# Patient Record
Sex: MALE | Race: WHITE | NOT HISPANIC OR LATINO
[De-identification: names, ages, dates, MRNs, and addresses within clinical notes are randomized per-mention and may not be internally consistent; named-entity substitution may affect disease eponyms.]

---

## 2022-02-08 PROBLEM — Z00.00 ENCOUNTER FOR PREVENTIVE HEALTH EXAMINATION: Status: ACTIVE | Noted: 2022-02-08

## 2022-02-10 ENCOUNTER — APPOINTMENT (OUTPATIENT)
Dept: CARDIOLOGY | Facility: CLINIC | Age: 67
End: 2022-02-10
Payer: COMMERCIAL

## 2022-02-10 ENCOUNTER — NON-APPOINTMENT (OUTPATIENT)
Age: 67
End: 2022-02-10

## 2022-02-10 VITALS
RESPIRATION RATE: 16 BRPM | WEIGHT: 207 LBS | HEART RATE: 72 BPM | OXYGEN SATURATION: 98 % | SYSTOLIC BLOOD PRESSURE: 154 MMHG | HEIGHT: 71 IN | DIASTOLIC BLOOD PRESSURE: 60 MMHG | BODY MASS INDEX: 28.98 KG/M2

## 2022-02-10 VITALS — SYSTOLIC BLOOD PRESSURE: 140 MMHG | DIASTOLIC BLOOD PRESSURE: 68 MMHG

## 2022-02-10 DIAGNOSIS — Z78.9 OTHER SPECIFIED HEALTH STATUS: ICD-10-CM

## 2022-02-10 DIAGNOSIS — Z80.3 FAMILY HISTORY OF MALIGNANT NEOPLASM OF BREAST: ICD-10-CM

## 2022-02-10 PROCEDURE — 93000 ELECTROCARDIOGRAM COMPLETE: CPT

## 2022-02-10 PROCEDURE — 99204 OFFICE O/P NEW MOD 45 MIN: CPT

## 2022-02-10 NOTE — HISTORY OF PRESENT ILLNESS
[FreeTextEntry1] : Patient is a 67yo M with HTN, HLD, family history of CAD and aneurysm  here for cardiac evaluation.  At beginning of pandemic was having heartburn and put on PPI which helped a bit. Symptoms also worse at night with burning and chest pressure. In past month symptoms better. Other night did get discomfort, does get better with tums. Occasionally would note some chest discomfort if walking fast or any activity he does quickly. Goes up to shoulders and neck as well. WOrse with stress too he states. NO dyspnea or associated symptoms. Patient denies PND/orthopnea/edema/palpitations/syncope/claudication. \par \par Works in construction.  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 \par \par ROS: GI negative, all others negative

## 2022-02-10 NOTE — PHYSICAL EXAM
[Soft] : abdomen soft [Non Tender] : non-tender [Normal] : no edema, no cyanosis, no clubbing, no varicosities [Moves all extremities] : moves all extremities [Alert and Oriented] : alert and oriented

## 2022-02-10 NOTE — DISCUSSION/SUMMARY
[FreeTextEntry1] : Patient is a 67yo M with HTN, HLD, family history of CAD  here for cardiac evaluation.\par -ECG abnormal with IVCD, NSST and PVCs, needs further cardiac work up for structural heart disease and ischemia \par -Also CP with typical and atypical features. Could be all GI but also could be ischemic. NEeds work up\par -Exam unremarkable \par \par 1.Echo and nuclear stress testing to evaluate CP/abnormal ECG\par 2.Holter to evaluate burden of PVCs\par 3. Aortic scan due to family history AAA and HTN\par 4.Continue high dose statin\par 5.BP mildly elevated today, will monitor during stress test and at follow up before med changes. STates usually lower\par 6. Follow up after testing

## 2022-02-18 ENCOUNTER — APPOINTMENT (OUTPATIENT)
Dept: CARDIOLOGY | Facility: CLINIC | Age: 67
End: 2022-02-18
Payer: COMMERCIAL

## 2022-02-18 PROCEDURE — 93978 VASCULAR STUDY: CPT

## 2022-02-18 PROCEDURE — 93306 TTE W/DOPPLER COMPLETE: CPT

## 2022-03-07 ENCOUNTER — APPOINTMENT (OUTPATIENT)
Dept: CARDIOLOGY | Facility: CLINIC | Age: 67
End: 2022-03-07
Payer: COMMERCIAL

## 2022-03-07 PROCEDURE — A9500: CPT

## 2022-03-07 PROCEDURE — 78452 HT MUSCLE IMAGE SPECT MULT: CPT

## 2022-03-07 PROCEDURE — 93015 CV STRESS TEST SUPVJ I&R: CPT

## 2022-03-11 ENCOUNTER — TRANSCRIPTION ENCOUNTER (OUTPATIENT)
Age: 67
End: 2022-03-11

## 2022-03-12 RX ORDER — CHLORHEXIDINE GLUCONATE 213 G/1000ML
1 SOLUTION TOPICAL ONCE
Refills: 0 | Status: COMPLETED | OUTPATIENT
Start: 2022-03-14 | End: 2022-03-15

## 2022-03-12 NOTE — H&P PST ADULT - ASSESSMENT
Risk Assessments:  ASA:  Mallampati:  GFR:   Cr:  BRA:      Impression:  66 year old male with PMH of HTN, HLD, faqmily history of CAD with intermittent persistent chest pain and abnormal NST and reduced EF 45-50% now here for [facility].      Plan:    -plan for *** via ***  -patient seen and examined  -confirmed appropriate NPO duration  -ECG and Labs reviewed  -Aspirin 81mg po pre-cath  -procedure discussed with patient; risks and benefits explained, questions answered  -consent obtained by attending IC   Risk Assessments:  ASA:  3  Mallampati:  2  GFR:   Cr:  BRA:      Impression:  66 year old male with PMH of HTN, HLD, faqmily history of CAD with intermittent persistent chest pain and abnormal NST and reduced EF 45-50% now here for LHC.      Plan:    -plan for LHC  -patient seen and examined  -confirmed appropriate NPO duration  -ECG and Labs reviewed  -Aspirin 81mg po pre-cath  -procedure discussed with patient; risks and benefits explained, questions answered  -consent obtained by attending IC   Risk Assessments:  ASA:  3  Mallampati:  2  GFR: 85  Cr:  0.98  BRA: 1.0%      Impression:  66 year old male with PMH of HTN, HLD, faqmily history of CAD with intermittent persistent chest pain and abnormal NST and reduced EF 45-50% now here for LHC.      Plan:    -plan for LHC  -patient seen and examined  -confirmed appropriate NPO duration  -ECG and Labs reviewed  -Aspirin 81mg po pre-cath  -procedure discussed with patient; risks and benefits explained, questions answered  -consent obtained by attending IC

## 2022-03-14 ENCOUNTER — TRANSCRIPTION ENCOUNTER (OUTPATIENT)
Age: 67
End: 2022-03-14

## 2022-03-14 ENCOUNTER — INPATIENT (INPATIENT)
Facility: HOSPITAL | Age: 67
LOS: 5 days | Discharge: HOME | DRG: 234 | End: 2022-03-20
Attending: THORACIC SURGERY (CARDIOTHORACIC VASCULAR SURGERY) | Admitting: INTERNAL MEDICINE
Payer: COMMERCIAL

## 2022-03-14 VITALS
TEMPERATURE: 98 F | SYSTOLIC BLOOD PRESSURE: 131 MMHG | WEIGHT: 207.01 LBS | HEIGHT: 71 IN | DIASTOLIC BLOOD PRESSURE: 63 MMHG | RESPIRATION RATE: 16 BRPM | OXYGEN SATURATION: 98 % | HEART RATE: 61 BPM

## 2022-03-14 DIAGNOSIS — Z90.89 ACQUIRED ABSENCE OF OTHER ORGANS: Chronic | ICD-10-CM

## 2022-03-14 DIAGNOSIS — Z98.890 OTHER SPECIFIED POSTPROCEDURAL STATES: Chronic | ICD-10-CM

## 2022-03-14 DIAGNOSIS — R94.39 ABNORMAL RESULT OF OTHER CARDIOVASCULAR FUNCTION STUDY: ICD-10-CM

## 2022-03-14 DIAGNOSIS — I25.10 ATHEROSCLEROTIC HEART DISEASE OF NATIVE CORONARY ARTERY WITHOUT ANGINA PECTORIS: ICD-10-CM

## 2022-03-14 LAB
A1C WITH ESTIMATED AVERAGE GLUCOSE RESULT: 6 % — HIGH (ref 4–5.6)
ALBUMIN SERPL ELPH-MCNC: 4.5 G/DL — SIGNIFICANT CHANGE UP (ref 3.3–5.2)
ALP SERPL-CCNC: 77 U/L — SIGNIFICANT CHANGE UP (ref 40–120)
ALT FLD-CCNC: 20 U/L — SIGNIFICANT CHANGE UP
ANION GAP SERPL CALC-SCNC: 10 MMOL/L — SIGNIFICANT CHANGE UP (ref 5–17)
AST SERPL-CCNC: 19 U/L — SIGNIFICANT CHANGE UP
BASOPHILS # BLD AUTO: 0.03 K/UL — SIGNIFICANT CHANGE UP (ref 0–0.2)
BASOPHILS NFR BLD AUTO: 0.4 % — SIGNIFICANT CHANGE UP (ref 0–2)
BILIRUB SERPL-MCNC: 0.6 MG/DL — SIGNIFICANT CHANGE UP (ref 0.4–2)
BUN SERPL-MCNC: 19.3 MG/DL — SIGNIFICANT CHANGE UP (ref 8–20)
CALCIUM SERPL-MCNC: 9.2 MG/DL — SIGNIFICANT CHANGE UP (ref 8.6–10.2)
CHLORIDE SERPL-SCNC: 103 MMOL/L — SIGNIFICANT CHANGE UP (ref 98–107)
CHOLEST SERPL-MCNC: 164 MG/DL — SIGNIFICANT CHANGE UP
CO2 SERPL-SCNC: 30 MMOL/L — HIGH (ref 22–29)
CREAT SERPL-MCNC: 0.98 MG/DL — SIGNIFICANT CHANGE UP (ref 0.5–1.3)
EGFR: 85 ML/MIN/1.73M2 — SIGNIFICANT CHANGE UP
EOSINOPHIL # BLD AUTO: 0.11 K/UL — SIGNIFICANT CHANGE UP (ref 0–0.5)
EOSINOPHIL NFR BLD AUTO: 1.5 % — SIGNIFICANT CHANGE UP (ref 0–6)
ESTIMATED AVERAGE GLUCOSE: 126 MG/DL — HIGH (ref 68–114)
GLUCOSE SERPL-MCNC: 108 MG/DL — HIGH (ref 70–99)
HCT VFR BLD CALC: 43.9 % — SIGNIFICANT CHANGE UP (ref 39–50)
HDLC SERPL-MCNC: 56 MG/DL — SIGNIFICANT CHANGE UP
HGB BLD-MCNC: 14.5 G/DL — SIGNIFICANT CHANGE UP (ref 13–17)
IMM GRANULOCYTES NFR BLD AUTO: 0.1 % — SIGNIFICANT CHANGE UP (ref 0–1.5)
LIPID PNL WITH DIRECT LDL SERPL: 92 MG/DL — SIGNIFICANT CHANGE UP
LYMPHOCYTES # BLD AUTO: 2.25 K/UL — SIGNIFICANT CHANGE UP (ref 1–3.3)
LYMPHOCYTES # BLD AUTO: 30.7 % — SIGNIFICANT CHANGE UP (ref 13–44)
MAGNESIUM SERPL-MCNC: 2 MG/DL — SIGNIFICANT CHANGE UP (ref 1.6–2.6)
MCHC RBC-ENTMCNC: 29.1 PG — SIGNIFICANT CHANGE UP (ref 27–34)
MCHC RBC-ENTMCNC: 33 GM/DL — SIGNIFICANT CHANGE UP (ref 32–36)
MCV RBC AUTO: 88.2 FL — SIGNIFICANT CHANGE UP (ref 80–100)
MONOCYTES # BLD AUTO: 0.67 K/UL — SIGNIFICANT CHANGE UP (ref 0–0.9)
MONOCYTES NFR BLD AUTO: 9.1 % — SIGNIFICANT CHANGE UP (ref 2–14)
NEUTROPHILS # BLD AUTO: 4.26 K/UL — SIGNIFICANT CHANGE UP (ref 1.8–7.4)
NEUTROPHILS NFR BLD AUTO: 58.2 % — SIGNIFICANT CHANGE UP (ref 43–77)
NON HDL CHOLESTEROL: 108 MG/DL — SIGNIFICANT CHANGE UP
PLATELET # BLD AUTO: 277 K/UL — SIGNIFICANT CHANGE UP (ref 150–400)
POTASSIUM SERPL-MCNC: 3.8 MMOL/L — SIGNIFICANT CHANGE UP (ref 3.5–5.3)
POTASSIUM SERPL-SCNC: 3.8 MMOL/L — SIGNIFICANT CHANGE UP (ref 3.5–5.3)
PROT SERPL-MCNC: 7.2 G/DL — SIGNIFICANT CHANGE UP (ref 6.6–8.7)
RBC # BLD: 4.98 M/UL — SIGNIFICANT CHANGE UP (ref 4.2–5.8)
RBC # FLD: 12.9 % — SIGNIFICANT CHANGE UP (ref 10.3–14.5)
SODIUM SERPL-SCNC: 143 MMOL/L — SIGNIFICANT CHANGE UP (ref 135–145)
TRIGL SERPL-MCNC: 78 MG/DL — SIGNIFICANT CHANGE UP
TROPONIN T SERPL-MCNC: <0.01 NG/ML — SIGNIFICANT CHANGE UP (ref 0–0.06)
WBC # BLD: 7.33 K/UL — SIGNIFICANT CHANGE UP (ref 3.8–10.5)
WBC # FLD AUTO: 7.33 K/UL — SIGNIFICANT CHANGE UP (ref 3.8–10.5)

## 2022-03-14 PROCEDURE — 93306 TTE W/DOPPLER COMPLETE: CPT | Mod: 26

## 2022-03-14 PROCEDURE — 71045 X-RAY EXAM CHEST 1 VIEW: CPT | Mod: 26

## 2022-03-14 PROCEDURE — 99152 MOD SED SAME PHYS/QHP 5/>YRS: CPT | Mod: 59

## 2022-03-14 PROCEDURE — 93880 EXTRACRANIAL BILAT STUDY: CPT | Mod: 26

## 2022-03-14 PROCEDURE — 93454 CORONARY ARTERY ANGIO S&I: CPT | Mod: 26

## 2022-03-14 PROCEDURE — 93010 ELECTROCARDIOGRAM REPORT: CPT

## 2022-03-14 RX ORDER — CHLORHEXIDINE GLUCONATE 213 G/1000ML
15 SOLUTION TOPICAL ONCE
Refills: 0 | Status: COMPLETED | OUTPATIENT
Start: 2022-03-14 | End: 2022-03-15

## 2022-03-14 RX ORDER — CEFUROXIME AXETIL 250 MG
1500 TABLET ORAL ONCE
Refills: 0 | Status: DISCONTINUED | OUTPATIENT
Start: 2022-03-15 | End: 2022-03-15

## 2022-03-14 RX ORDER — METOPROLOL TARTRATE 50 MG
12.5 TABLET ORAL
Refills: 0 | Status: DISCONTINUED | OUTPATIENT
Start: 2022-03-14 | End: 2022-03-14

## 2022-03-14 RX ORDER — VANCOMYCIN HCL 1 G
1500 VIAL (EA) INTRAVENOUS ONCE
Refills: 0 | Status: DISCONTINUED | OUTPATIENT
Start: 2022-03-15 | End: 2022-03-15

## 2022-03-14 RX ORDER — PANTOPRAZOLE SODIUM 20 MG/1
40 TABLET, DELAYED RELEASE ORAL
Refills: 0 | Status: DISCONTINUED | OUTPATIENT
Start: 2022-03-14 | End: 2022-03-15

## 2022-03-14 RX ORDER — ASPIRIN/CALCIUM CARB/MAGNESIUM 324 MG
81 TABLET ORAL DAILY
Refills: 0 | Status: DISCONTINUED | OUTPATIENT
Start: 2022-03-14 | End: 2022-03-15

## 2022-03-14 RX ORDER — CHLORHEXIDINE GLUCONATE 213 G/1000ML
1 SOLUTION TOPICAL ONCE
Refills: 0 | Status: COMPLETED | OUTPATIENT
Start: 2022-03-14 | End: 2022-03-14

## 2022-03-14 RX ORDER — ATORVASTATIN CALCIUM 80 MG/1
80 TABLET, FILM COATED ORAL AT BEDTIME
Refills: 0 | Status: DISCONTINUED | OUTPATIENT
Start: 2022-03-14 | End: 2022-03-15

## 2022-03-14 RX ADMIN — CHLORHEXIDINE GLUCONATE 1 APPLICATION(S): 213 SOLUTION TOPICAL at 22:12

## 2022-03-14 RX ADMIN — ATORVASTATIN CALCIUM 80 MILLIGRAM(S): 80 TABLET, FILM COATED ORAL at 22:08

## 2022-03-14 RX ADMIN — Medication 12.5 MILLIGRAM(S): at 18:59

## 2022-03-14 NOTE — CONSULT NOTE ADULT - ASSESSMENT
HPI:  Narrative: 66 year old male with PMH of HTN, HLD, faqmily history of CAD with intermittent persistent chest pain and abnormal NST and reduced EF 45-50% now here for [facility].    Cath results as follows:    ca< from: Cardiac Catheterization (22 @ 13:51) >  [facility]  Department of Cardiology  [address] [place] [address]  [phone]  Cath Lab Report -- Comprehensive Report  Patient: TREVOR JAIN  Study date: 2022  Account number: [id number]  MR number: [id number]  : 1955  Gender: Male  Race: W  Case Physician(s):  Mo Andujar MD  Fellow:  Tal Anaya M.D.  Referring Physician:  Tacos Bush M.D.  INDICATIONS: Abnormal stress test.  HISTORY: Atypical symptoms with high risk nuclear stress test.  PROCEDURE:  --  Right coronary angiography.  --  Left coronary angiography.  TECHNIQUE: The risks and alternatives of the procedures and conscious  sedation were explained to the patient and informed consent was obtained.  Cardiac catheterization performed electively.  Local anesthetic given. Right radial artery access. Right coronary artery  angiography. The vessel was injected utilizing a catheter. Left coronary  artery angiography. The vessel was injected utilizing a catheter.  RADIATION EXPOSURE: 1.9 min.  CONTRAST GIVEN: Omnipaque 45 ml.  MEDICATIONS GIVEN: Midazolam, 1 mg, IV. Fentanyl, 50 mcg, IV. Verapamil  (Isoptin, Calan, Covera), 5 mg, IA. Heparin, 4000 units, IA. 1% Lidocaine,  5 ml, subcutaneously.  VENTRICLES: No LV gram was performed; however, a recent echocardiogram  demonstrated an EF of 45 %.  CORONARY VESSELS: The coronary circulation is left dominant.  LM:   --  Mid left main: There was a 50 % stenosis.  --  Distal left main: There was a 80 % stenosis.  LAD:   --  Proximal LAD: There was a 30 % stenosis.  CX:   --  Proximal circumflex: There was a 95 % stenosis.  --  OM2: There was a 90 % stenosis.  RI:   --  Ramus intermedius: There was a 80 % stenosis.  RCA:   --  RCA: Normal.  COMPLICATIONS: No complications occurred during the cath lab visit.  DIAGNOSTIC IMPRESSIONS: Severe distal LM, ostial circumflex disease in left  dominant system.  Mildly depressed LV function by TTE.  DIAGNOSTIC RECOMMENDATIONS: CT surgery evaluation for CABG.  Prepared and signed by  Mo Andujar MD  Signed 2022 14:48:58  HEMODYNAMIC TABLES  Pressures:  Baseline  Pressures:  - HR: 59  Pressures:  - Rhythm:  Pressures:  -- Aortic Pressure (S/D/M): 115/59/80  Outputs:  Baseline  Outputs:  -- CALCULATIONS[age]  Outputs:  -- CALCULATIONS: Body Surface Area: 2.14  Outputs:  -- CALCULATIONS: Height in cm: 180.00  Outputs:  -- CALCULATIONS: Sex: Male  Outputs:  -- CALCULATIONS: Weight in k.90  Outputs:  -- OUTPUTS: O2 consumption: 267.14  Outputs:  -- OUTPUTS: Vo2 Indexed: 125.00    < end of copied text >       HPI:  Narrative: 66 year old male with PMH of HTN, HLD, family history of CAD with intermittent persistent chest pain and abnormal NST and reduced EF 45-50% now here for [facility].      Cath results as follows:    ca< from: Cardiac Catheterization (22 @ 13:51) >  [facility]  Department of Cardiology  [address] [place] [address]  [phone]  Cath Lab Report -- Comprehensive Report  Patient: TREVOR JAIN  Study date: 2022  Account number: [id number]  MR number: [id number]  : 1955  Gender: Male  Race: W  Case Physician(s):  Mo Andujar MD  Fellow:  Tal Anaya M.D.  Referring Physician:  Tacos Bush M.D.  INDICATIONS: Abnormal stress test.  HISTORY: Atypical symptoms with high risk nuclear stress test.  PROCEDURE:  --  Right coronary angiography.  --  Left coronary angiography.  TECHNIQUE: The risks and alternatives of the procedures and conscious  sedation were explained to the patient and informed consent was obtained.  Cardiac catheterization performed electively.  Local anesthetic given. Right radial artery access. Right coronary artery  angiography. The vessel was injected utilizing a catheter. Left coronary  artery angiography. The vessel was injected utilizing a catheter.  RADIATION EXPOSURE: 1.9 min.  CONTRAST GIVEN: Omnipaque 45 ml.  MEDICATIONS GIVEN: Midazolam, 1 mg, IV. Fentanyl, 50 mcg, IV. Verapamil  (Isoptin, Calan, Covera), 5 mg, IA. Heparin, 4000 units, IA. 1% Lidocaine,  5 ml, subcutaneously.  VENTRICLES: No LV gram was performed; however, a recent echocardiogram  demonstrated an EF of 45 %.  CORONARY VESSELS: The coronary circulation is left dominant.  LM:   --  Mid left main: There was a 50 % stenosis.  --  Distal left main: There was a 80 % stenosis.  LAD:   --  Proximal LAD: There was a 30 % stenosis.  CX:   --  Proximal circumflex: There was a 95 % stenosis.  --  OM2: There was a 90 % stenosis.  RI:   --  Ramus intermedius: There was a 80 % stenosis.  RCA:   --  RCA: Normal.  COMPLICATIONS: No complications occurred during the cath lab visit.  DIAGNOSTIC IMPRESSIONS: Severe distal LM, ostial circumflex disease in left  dominant system.  Mildly depressed LV function by TTE.  DIAGNOSTIC RECOMMENDATIONS: CT surgery evaluation for CABG.  Prepared and signed by  Mo Andujar MD  Signed 2022 14:48:58  HEMODYNAMIC TABLES  Pressures:  Baseline  Pressures:  - HR: 59  Pressures:  - Rhythm:  Pressures:  -- Aortic Pressure (S/D/M): 115/59/80  Outputs:  Baseline  Outputs:  -- CALCULATIONS[age]  Outputs:  -- CALCULATIONS: Body Surface Area: 2.14  Outputs:  -- CALCULATIONS: Height in cm: 180.00  Outputs:  -- CALCULATIONS: Sex: Male  Outputs:  -- CALCULATIONS: Weight in k.90  Outputs:  -- OUTPUTS: O2 consumption: 267.14  Outputs:  -- OUTPUTS: Vo2 Indexed: 125.00    < end of copied text >    Bilateral lower extremity vein mapping performed:  Both legs with usable conduit.  Right thigh vein mildly thickened as compared to left thigh    Radial artery ultrasound performed:  radial artery NOT suitable for bypass conduit secondary to bifurcation and small caliber.

## 2022-03-14 NOTE — CONSULT NOTE ADULT - PROBLEM SELECTOR RECOMMENDATION 9
Severe distal LM disease with mildly depressed LV function  CABG work up as per Dr. Murdock  Radials Rudi test to assess suitability of redial artery for bypass conduit  Continue Lipitor  Start Beta blocker  Continue ASA  Plan for OR tomorrow Severe distal LM disease with mildly depressed LV function  CABG work up as per Dr. Murdock  Radials Rudi test to assess suitability of redial artery for bypass conduit reveals artery NOT suitable  Bilateral saphenous veins usable for by[pass conduit   Continue Lipitor  Start Beta blocker low dose as tolerated  Continue ASA  Plan for OR tomorrow

## 2022-03-14 NOTE — CONSULT NOTE ADULT - SUBJECTIVE AND OBJECTIVE BOX
HPI:  Narrative: 66 year old male with PMH of HTN, HLD, faqmily history of CAD with intermittent persistent chest pain and abnormal NST and reduced EF 45-50% now here for [facility].    Symptoms:        Angina (Class): CCS2       Ischemic Symptoms: Chest pain     Heart Failure: New        Systolic/Diastolic/Combined: systolic        NYHA Class (within 2 weeks): II    Assessment of LVEF (Must be within 6 months):        EF: 45-50%        Assessed by: TTE        Date: 2/18/2022    Prior Cardiac Interventions (LHC, stents, CABG): NO      Noninvasive Testing:   Stress Test: Date: 3/7/2022       Protocol: Regadenoson        Duration of Exercise: 4 minutes and 39 seconds        Symptoms: atypical chest pain/nausea        EKG Changes: frequent PVC's        DTS: -3       Myocardial Imaging: moderate to large sized area of moderate inferior/inferolateral wall ischemia        Risk Assessment (Low, Medium, High): medium     Echo (Date, Findings): 2/18/2022 EF 45-50%, Basal inferiro segment, basal inferoseptal segment, basal inferolateral segment, and mid inferiro segment are moderately hypokinetic, mild VA/TR     Antianginal Therapies:        Beta Blockers:         Calcium Channel Blockers:        Long Acting Nitrates:        Ranexa:     Associated Risk Factors:        Cerebrovascular Disease: N/A       Chronic Lung Disease: N/A       Peripheral Arterial Disease: N/A       Chronic Kidney Disease (if yes, what is GFR): N/A       Uncontrolled Diabetes (if yes, what is HgbA1C or FBS): N/A       Poorly Controlled Hypertension (if yes, what is SBP): N/A       Morbid Obesity (if yes, what is BMI): N/A       History of Recent Ventricular Arrhythmia: N/A       Inability to Ambulate Safely: N/A       Need for Therapeutic Anticoagulation: N/A       Antiplatelet or Contrast Allergy: N/A    Social History:        Marital:  ; employed in home construction       Tobacco: Never       ETOH: 1-3 beers 3x/week       Caffeine: 1-3 cups coffee daily    ROS:  CONSTITUTIONAL: No weakness, fevers or chills  EYES/ENT: No visual changes;  No vertigo or throat pain   NECK: No pain or stiffness  RESPIRATORY: No cough, wheezing, hemoptysis; No shortness of breath  CARDIOVASCULAR: No chest pain or palpitations  GASTROINTESTINAL: Epigastric burning and reflux; No abdominal or epigastric pain. No nausea, vomiting, or hematemesis; No diarrhea or constipation. No melena or hematochezia.  GENITOURINARY: No dysuria, frequency or hematuria  NEUROLOGICAL: No numbness or weakness  SKIN: No itching, burning, rashes, or lesions   All other review of systems is negative unless indicated above    PHYSICAL EXAM:    Vital Signs Last 24 Hrs  T(F): 98.6  HR: 57  BP: 131/63  RR: 20  SpO2: 98%    GENERAL: Pt lying comfortably, NAD.  ENMT: PERRL, +EOMI.  NECK: soft, Supple, No JVD,   CHEST/LUNG: Clear to auscultation bilaterally; No wheezing.  HEART: S1S2+, Regular rate and rhythm; No murmurs.  ABDOMEN: Soft, Nontender, Nondistended; Bowel sounds present.  MUSCULOSKELETAL: Normal range of motion.  SKIN: No rashes or lesions.  NEURO: AAOX3, no focal deficits, no motor r sensory loss.  PSYCH: normal mood.  VASCULAR:   Radial +2 R/+2 L  Femoral +2 R/+2 L  PT +2 R/+2 L  DP +2 R/+2 L    EKG: SB with PVC   (12 Mar 2022 16:04)      PAST MEDICAL & SURGICAL HISTORY:  HTN (hypertension)    HLD (hyperlipidemia)    H/O carpal tunnel repair    S/P tonsillectomy        Allergies    naproxen (Unknown)    Intolerances        MEDICATIONS  (STANDING):  atorvastatin 80 milliGRAM(s) Oral at bedtime  chlorhexidine 4% Liquid 1 Application(s) Topical Once  pantoprazole    Tablet 40 milliGRAM(s) Oral before breakfast    MEDICATIONS  (PRN):  aluminum hydroxide/magnesium hydroxide/simethicone Suspension 30 milliLiter(s) Oral every 6 hours PRN Dyspepsia      Social History:      FAMILY HISTORY:                            14.5   7.33  )-----------( 277      ( 14 Mar 2022 11:22 )             43.9   03-14    143  |  103  |  19.3  ----------------------------<  108<H>  3.8   |  30.0<H>  |  0.98    Ca    9.2      14 Mar 2022 11:22  Mg     2.0     03-14    TPro  7.2  /  Alb  4.5  /  TBili  0.6  /  DBili  x   /  AST  19  /  ALT  20  /  AlkPhos  77  03-14      CXR:          ICU Vital Signs Last 24 Hrs  T(C): 36.7 (14 Mar 2022 11:35), Max: 36.7 (14 Mar 2022 11:35)  T(F): 98 (14 Mar 2022 11:35), Max: 98 (14 Mar 2022 11:35)  HR: 65 (14 Mar 2022 15:00) (61 - 67)  BP: 131/62 (14 Mar 2022 15:00) (131/62 - 150/73)  BP(mean): --  ABP: --  ABP(mean): --  RR: 16 (14 Mar 2022 15:00) (16 - 19)  SpO2: 99% (14 Mar 2022 15:00) (98% - 99%)  I&O's Detail       HPI:  Narrative: 66 year old male with PMH of HTN, HLD, faqmily history of CAD with intermittent persistent chest pain and abnormal NST and reduced EF 45-50% now here for [facility].    Symptoms:        Angina (Class): CCS2       Ischemic Symptoms: Chest pain     Heart Failure: New        Systolic/Diastolic/Combined: systolic        NYHA Class (within 2 weeks): II    Assessment of LVEF (Must be within 6 months):        EF: 45-50%        Assessed by: TTE        Date: 2/18/2022    Prior Cardiac Interventions (LHC, stents, CABG): NO      Noninvasive Testing:   Stress Test: Date: 3/7/2022       Protocol: Regadenoson        Duration of Exercise: 4 minutes and 39 seconds        Symptoms: atypical chest pain/nausea        EKG Changes: frequent PVC's        DTS: -3       Myocardial Imaging: moderate to large sized area of moderate inferior/inferolateral wall ischemia        Risk Assessment (Low, Medium, High): medium     Echo (Date, Findings): 2/18/2022 EF 45-50%, Basal inferiro segment, basal inferoseptal segment, basal inferolateral segment, and mid inferiro segment are moderately hypokinetic, mild IN/TR     Antianginal Therapies:        Beta Blockers:         Calcium Channel Blockers:        Long Acting Nitrates:        Ranexa:     Associated Risk Factors:        Cerebrovascular Disease: N/A       Chronic Lung Disease: N/A       Peripheral Arterial Disease: N/A       Chronic Kidney Disease (if yes, what is GFR): N/A       Uncontrolled Diabetes (if yes, what is HgbA1C or FBS): N/A       Poorly Controlled Hypertension (if yes, what is SBP): N/A       Morbid Obesity (if yes, what is BMI): N/A       History of Recent Ventricular Arrhythmia: N/A       Inability to Ambulate Safely: N/A       Need for Therapeutic Anticoagulation: N/A       Antiplatelet or Contrast Allergy: N/A    Social History:        Marital:  ; employed in home construction       Tobacco: Never       ETOH: 1-3 beers 3x/week       Caffeine: 1-3 cups coffee daily    ROS:  CONSTITUTIONAL: No weakness, fevers or chills  EYES/ENT: No visual changes;  No vertigo or throat pain   NECK: No pain or stiffness  RESPIRATORY: No cough, wheezing, hemoptysis; No shortness of breath  CARDIOVASCULAR: No chest pain or palpitations  GASTROINTESTINAL: Epigastric burning and reflux; No abdominal or epigastric pain. No nausea, vomiting, or hematemesis; No diarrhea or constipation. No melena or hematochezia.  GENITOURINARY: No dysuria, frequency or hematuria  NEUROLOGICAL: No numbness or weakness  SKIN: No itching, burning, rashes, or lesions   All other review of systems is negative unless indicated above    PHYSICAL EXAM:    Vital Signs Last 24 Hrs  T(F): 98.6  HR: 57  BP: 131/63  RR: 20  SpO2: 98%    GENERAL: Pt lying comfortably, NAD.  ENMT: PERRL, +EOMI.  NECK: soft, Supple, No JVD,   CHEST/LUNG: Clear to auscultation bilaterally; No wheezing.  HEART: S1S2+, Regular rate and rhythm; No murmurs.  ABDOMEN: Soft, Nontender, Nondistended; Bowel sounds present.  MUSCULOSKELETAL: Normal range of motion.  SKIN: No rashes or lesions.  NEURO: AAOX3, no focal deficits, no motor r sensory loss.  PSYCH: normal mood.  VASCULAR:   Radial +2 R/+2 L  Femoral +2 R/+2 L  PT +2 R/+2 L  DP +2 R/+2 L    EKG: SB with PVC   (12 Mar 2022 16:04)      PAST MEDICAL & SURGICAL HISTORY:  HTN (hypertension)    HLD (hyperlipidemia)    H/O carpal tunnel repair    S/P tonsillectomy        Allergies    naproxen (Unknown)    Intolerances    Home Medications:  aspirin 81 mg oral tablet: orally once a day (14 Mar 2022 11:22)  Lipitor 80 mg oral tablet: 1 tab(s) orally once a day (14 Mar 2022 11:21)  losartan-hydrochlorothiazide 100 mg-25 mg oral tablet: 1 tab(s) orally once a day (14 Mar 2022 11:21)          MEDICATIONS  (STANDING):  atorvastatin 80 milliGRAM(s) Oral at bedtime  chlorhexidine 4% Liquid 1 Application(s) Topical Once  pantoprazole    Tablet 40 milliGRAM(s) Oral before breakfast    MEDICATIONS  (PRN):  aluminum hydroxide/magnesium hydroxide/simethicone Suspension 30 milliLiter(s) Oral every 6 hours PRN Dyspepsia      Social History:      FAMILY HISTORY:                            14.5   7.33  )-----------( 277      ( 14 Mar 2022 11:22 )             43.9   03-14    143  |  103  |  19.3  ----------------------------<  108<H>  3.8   |  30.0<H>  |  0.98    Ca    9.2      14 Mar 2022 11:22  Mg     2.0     03-14    TPro  7.2  /  Alb  4.5  /  TBili  0.6  /  DBili  x   /  AST  19  /  ALT  20  /  AlkPhos  77  03-14      CXR:          ICU Vital Signs Last 24 Hrs  T(C): 36.7 (14 Mar 2022 11:35), Max: 36.7 (14 Mar 2022 11:35)  T(F): 98 (14 Mar 2022 11:35), Max: 98 (14 Mar 2022 11:35)  HR: 65 (14 Mar 2022 15:00) (61 - 67)  BP: 131/62 (14 Mar 2022 15:00) (131/62 - 150/73)  BP(mean): --  ABP: --  ABP(mean): --  RR: 16 (14 Mar 2022 15:00) (16 - 19)  SpO2: 99% (14 Mar 2022 15:00) (98% - 99%)  I&O's Detail

## 2022-03-14 NOTE — PROGRESS NOTE ADULT - SUBJECTIVE AND OBJECTIVE BOX
Now s/p LHC via RRA with 70-80% dLM, 50% oLAD, 95% oLCx. Procedure performed by Dr. Andujar. He recieved Heparin 4000 units and IV sedation intraprocedurally, arrived to recovery in NAD and HDS, RRA access site stable, no bleed/hematoma, distal pulse +  Pt tolerated procedure well  CTS consult called    VSS  Awake and alert  Lungs CTA  Heart normal S1, S2  RRA with hemoband in place. No oozing or hematoma. Radial 2+. R hand WWP without motor or sensory deficits    Plan:  Admit to CTS for OHS  -Formal cath report pending  -Post procedure management/monitoring per protocol  -Access site precautions  -Radial compression band removal at 1600  -Bedrest x 2 hours post procedure  -Labs and EKG in am  -Repeat ECG if any clinical indication or change on tele  -Continue statin  -No BB sec to bradycardia  -Educated regarding post procedure management and care  -Discussed the importance of RF modification

## 2022-03-15 ENCOUNTER — APPOINTMENT (OUTPATIENT)
Dept: CARDIOTHORACIC SURGERY | Facility: HOSPITAL | Age: 67
End: 2022-03-15

## 2022-03-15 LAB
ABO RH CONFIRMATION: SIGNIFICANT CHANGE UP
ALBUMIN SERPL ELPH-MCNC: 3.1 G/DL — LOW (ref 3.3–5.2)
ALP SERPL-CCNC: 49 U/L — SIGNIFICANT CHANGE UP (ref 40–120)
ALT FLD-CCNC: 11 U/L — SIGNIFICANT CHANGE UP
ANION GAP SERPL CALC-SCNC: 13 MMOL/L — SIGNIFICANT CHANGE UP (ref 5–17)
ANION GAP SERPL CALC-SCNC: 13 MMOL/L — SIGNIFICANT CHANGE UP (ref 5–17)
APPEARANCE UR: CLEAR — SIGNIFICANT CHANGE UP
APTT BLD: 25.4 SEC — LOW (ref 27.5–35.5)
APTT BLD: 26.9 SEC — LOW (ref 27.5–35.5)
APTT BLD: 27.8 SEC — SIGNIFICANT CHANGE UP (ref 27.5–35.5)
AST SERPL-CCNC: 24 U/L — SIGNIFICANT CHANGE UP
BASE EXCESS BLDV CALC-SCNC: -2 MMOL/L — SIGNIFICANT CHANGE UP (ref -2–3)
BASOPHILS # BLD AUTO: 0 K/UL — SIGNIFICANT CHANGE UP (ref 0–0.2)
BASOPHILS # BLD AUTO: 0.03 K/UL — SIGNIFICANT CHANGE UP (ref 0–0.2)
BASOPHILS NFR BLD AUTO: 0 % — SIGNIFICANT CHANGE UP (ref 0–2)
BASOPHILS NFR BLD AUTO: 0.3 % — SIGNIFICANT CHANGE UP (ref 0–2)
BILIRUB SERPL-MCNC: 0.5 MG/DL — SIGNIFICANT CHANGE UP (ref 0.4–2)
BILIRUB UR-MCNC: NEGATIVE — SIGNIFICANT CHANGE UP
BLD GP AB SCN SERPL QL: SIGNIFICANT CHANGE UP
BUN SERPL-MCNC: 15.9 MG/DL — SIGNIFICANT CHANGE UP (ref 8–20)
BUN SERPL-MCNC: 18.9 MG/DL — SIGNIFICANT CHANGE UP (ref 8–20)
BURR CELLS BLD QL SMEAR: PRESENT — SIGNIFICANT CHANGE UP
CALCIUM SERPL-MCNC: 8.3 MG/DL — LOW (ref 8.6–10.2)
CALCIUM SERPL-MCNC: 9.3 MG/DL — SIGNIFICANT CHANGE UP (ref 8.6–10.2)
CHLORIDE SERPL-SCNC: 103 MMOL/L — SIGNIFICANT CHANGE UP (ref 98–107)
CHLORIDE SERPL-SCNC: 108 MMOL/L — HIGH (ref 98–107)
CK MB CFR SERPL CALC: 24.1 NG/ML — HIGH (ref 0–6.7)
CK SERPL-CCNC: 320 U/L — HIGH (ref 30–200)
CO2 SERPL-SCNC: 20 MMOL/L — LOW (ref 22–29)
CO2 SERPL-SCNC: 24 MMOL/L — SIGNIFICANT CHANGE UP (ref 22–29)
COLOR SPEC: YELLOW — SIGNIFICANT CHANGE UP
CREAT SERPL-MCNC: 0.79 MG/DL — SIGNIFICANT CHANGE UP (ref 0.5–1.3)
CREAT SERPL-MCNC: 0.97 MG/DL — SIGNIFICANT CHANGE UP (ref 0.5–1.3)
DIFF PNL FLD: NEGATIVE — SIGNIFICANT CHANGE UP
EGFR: 86 ML/MIN/1.73M2 — SIGNIFICANT CHANGE UP
EGFR: 98 ML/MIN/1.73M2 — SIGNIFICANT CHANGE UP
ELLIPTOCYTES BLD QL SMEAR: SLIGHT — SIGNIFICANT CHANGE UP
EOSINOPHIL # BLD AUTO: 0 K/UL — SIGNIFICANT CHANGE UP (ref 0–0.5)
EOSINOPHIL # BLD AUTO: 0.11 K/UL — SIGNIFICANT CHANGE UP (ref 0–0.5)
EOSINOPHIL NFR BLD AUTO: 0 % — SIGNIFICANT CHANGE UP (ref 0–6)
EOSINOPHIL NFR BLD AUTO: 1.2 % — SIGNIFICANT CHANGE UP (ref 0–6)
GAS PNL BLDA: SIGNIFICANT CHANGE UP
GAS PNL BLDV: SIGNIFICANT CHANGE UP
GLUCOSE BLDC GLUCOMTR-MCNC: 146 MG/DL — HIGH (ref 70–99)
GLUCOSE BLDC GLUCOMTR-MCNC: 169 MG/DL — HIGH (ref 70–99)
GLUCOSE BLDC GLUCOMTR-MCNC: 183 MG/DL — HIGH (ref 70–99)
GLUCOSE BLDC GLUCOMTR-MCNC: 186 MG/DL — HIGH (ref 70–99)
GLUCOSE BLDC GLUCOMTR-MCNC: 189 MG/DL — HIGH (ref 70–99)
GLUCOSE SERPL-MCNC: 174 MG/DL — HIGH (ref 70–99)
GLUCOSE SERPL-MCNC: 99 MG/DL — SIGNIFICANT CHANGE UP (ref 70–99)
GLUCOSE UR QL: NEGATIVE MG/DL — SIGNIFICANT CHANGE UP
HCO3 BLDV-SCNC: 23 MMOL/L — SIGNIFICANT CHANGE UP (ref 22–29)
HCT VFR BLD CALC: 35 % — LOW (ref 39–50)
HCT VFR BLD CALC: 42.8 % — SIGNIFICANT CHANGE UP (ref 39–50)
HGB BLD-MCNC: 11.8 G/DL — LOW (ref 13–17)
HGB BLD-MCNC: 14.3 G/DL — SIGNIFICANT CHANGE UP (ref 13–17)
IMM GRANULOCYTES NFR BLD AUTO: 0.2 % — SIGNIFICANT CHANGE UP (ref 0–1.5)
INR BLD: 0.94 RATIO — SIGNIFICANT CHANGE UP (ref 0.88–1.16)
INR BLD: 0.99 RATIO — SIGNIFICANT CHANGE UP (ref 0.88–1.16)
INR BLD: 1.26 RATIO — HIGH (ref 0.88–1.16)
KETONES UR-MCNC: NEGATIVE — SIGNIFICANT CHANGE UP
LEUKOCYTE ESTERASE UR-ACNC: NEGATIVE — SIGNIFICANT CHANGE UP
LYMPHOCYTES # BLD AUTO: 1.31 K/UL — SIGNIFICANT CHANGE UP (ref 1–3.3)
LYMPHOCYTES # BLD AUTO: 2.59 K/UL — SIGNIFICANT CHANGE UP (ref 1–3.3)
LYMPHOCYTES # BLD AUTO: 27.4 % — SIGNIFICANT CHANGE UP (ref 13–44)
LYMPHOCYTES # BLD AUTO: 4.4 % — LOW (ref 13–44)
MAGNESIUM SERPL-MCNC: 2.2 MG/DL — SIGNIFICANT CHANGE UP (ref 1.6–2.6)
MANUAL SMEAR VERIFICATION: SIGNIFICANT CHANGE UP
MCHC RBC-ENTMCNC: 29.3 PG — SIGNIFICANT CHANGE UP (ref 27–34)
MCHC RBC-ENTMCNC: 29.5 PG — SIGNIFICANT CHANGE UP (ref 27–34)
MCHC RBC-ENTMCNC: 33.4 GM/DL — SIGNIFICANT CHANGE UP (ref 32–36)
MCHC RBC-ENTMCNC: 33.7 GM/DL — SIGNIFICANT CHANGE UP (ref 32–36)
MCV RBC AUTO: 86.8 FL — SIGNIFICANT CHANGE UP (ref 80–100)
MCV RBC AUTO: 88.2 FL — SIGNIFICANT CHANGE UP (ref 80–100)
MONOCYTES # BLD AUTO: 0.54 K/UL — SIGNIFICANT CHANGE UP (ref 0–0.9)
MONOCYTES # BLD AUTO: 0.84 K/UL — SIGNIFICANT CHANGE UP (ref 0–0.9)
MONOCYTES NFR BLD AUTO: 1.8 % — LOW (ref 2–14)
MONOCYTES NFR BLD AUTO: 8.9 % — SIGNIFICANT CHANGE UP (ref 2–14)
MRSA PCR RESULT.: SIGNIFICANT CHANGE UP
NEUTROPHILS # BLD AUTO: 27.68 K/UL — HIGH (ref 1.8–7.4)
NEUTROPHILS # BLD AUTO: 5.86 K/UL — SIGNIFICANT CHANGE UP (ref 1.8–7.4)
NEUTROPHILS NFR BLD AUTO: 62 % — SIGNIFICANT CHANGE UP (ref 43–77)
NEUTROPHILS NFR BLD AUTO: 90.3 % — HIGH (ref 43–77)
NEUTS BAND # BLD: 2.6 % — SIGNIFICANT CHANGE UP (ref 0–8)
NITRITE UR-MCNC: NEGATIVE — SIGNIFICANT CHANGE UP
OVALOCYTES BLD QL SMEAR: SLIGHT — SIGNIFICANT CHANGE UP
PCO2 BLDV: 38 MMHG — LOW (ref 42–55)
PH BLDV: 7.39 — SIGNIFICANT CHANGE UP (ref 7.32–7.43)
PH UR: 6 — SIGNIFICANT CHANGE UP (ref 5–8)
PLAT MORPH BLD: NORMAL — SIGNIFICANT CHANGE UP
PLATELET # BLD AUTO: 216 K/UL — SIGNIFICANT CHANGE UP (ref 150–400)
PLATELET # BLD AUTO: 272 K/UL — SIGNIFICANT CHANGE UP (ref 150–400)
PO2 BLDV: 44 MMHG — SIGNIFICANT CHANGE UP (ref 25–45)
POIKILOCYTOSIS BLD QL AUTO: SLIGHT — SIGNIFICANT CHANGE UP
POTASSIUM SERPL-MCNC: 3.7 MMOL/L — SIGNIFICANT CHANGE UP (ref 3.5–5.3)
POTASSIUM SERPL-MCNC: 4.1 MMOL/L — SIGNIFICANT CHANGE UP (ref 3.5–5.3)
POTASSIUM SERPL-SCNC: 3.7 MMOL/L — SIGNIFICANT CHANGE UP (ref 3.5–5.3)
POTASSIUM SERPL-SCNC: 4.1 MMOL/L — SIGNIFICANT CHANGE UP (ref 3.5–5.3)
PROT SERPL-MCNC: 4.9 G/DL — LOW (ref 6.6–8.7)
PROT UR-MCNC: 15
PROTHROM AB SERPL-ACNC: 10.9 SEC — SIGNIFICANT CHANGE UP (ref 10.5–13.4)
PROTHROM AB SERPL-ACNC: 11.5 SEC — SIGNIFICANT CHANGE UP (ref 10.5–13.4)
PROTHROM AB SERPL-ACNC: 14.6 SEC — HIGH (ref 10.5–13.4)
RBC # BLD: 4.03 M/UL — LOW (ref 4.2–5.8)
RBC # BLD: 4.85 M/UL — SIGNIFICANT CHANGE UP (ref 4.2–5.8)
RBC # FLD: 12.7 % — SIGNIFICANT CHANGE UP (ref 10.3–14.5)
RBC # FLD: 13 % — SIGNIFICANT CHANGE UP (ref 10.3–14.5)
RBC BLD AUTO: ABNORMAL
S AUREUS DNA NOSE QL NAA+PROBE: SIGNIFICANT CHANGE UP
SAO2 % BLDV: 74.4 % — SIGNIFICANT CHANGE UP
SARS-COV-2 RNA SPEC QL NAA+PROBE: SIGNIFICANT CHANGE UP
SMUDGE CELLS # BLD: PRESENT — SIGNIFICANT CHANGE UP
SODIUM SERPL-SCNC: 140 MMOL/L — SIGNIFICANT CHANGE UP (ref 135–145)
SODIUM SERPL-SCNC: 141 MMOL/L — SIGNIFICANT CHANGE UP (ref 135–145)
SP GR SPEC: 1.01 — SIGNIFICANT CHANGE UP (ref 1.01–1.02)
T3 SERPL-MCNC: 95 NG/DL — SIGNIFICANT CHANGE UP (ref 80–200)
T4 AB SER-ACNC: 5.9 UG/DL — SIGNIFICANT CHANGE UP (ref 4.5–12)
TROPONIN T SERPL-MCNC: 0.21 NG/ML — HIGH (ref 0–0.06)
TSH SERPL-MCNC: 1.57 UIU/ML — SIGNIFICANT CHANGE UP (ref 0.27–4.2)
UROBILINOGEN FLD QL: NEGATIVE MG/DL — SIGNIFICANT CHANGE UP
VARIANT LYMPHS # BLD: 0.9 % — SIGNIFICANT CHANGE UP (ref 0–6)
WBC # BLD: 29.8 K/UL — HIGH (ref 3.8–10.5)
WBC # BLD: 9.45 K/UL — SIGNIFICANT CHANGE UP (ref 3.8–10.5)
WBC # FLD AUTO: 29.8 K/UL — HIGH (ref 3.8–10.5)
WBC # FLD AUTO: 9.45 K/UL — SIGNIFICANT CHANGE UP (ref 3.8–10.5)

## 2022-03-15 PROCEDURE — 33534 CABG ARTERIAL TWO: CPT | Mod: AS

## 2022-03-15 PROCEDURE — 33517 CABG ARTERY-VEIN SINGLE: CPT

## 2022-03-15 PROCEDURE — 33508 ENDOSCOPIC VEIN HARVEST: CPT | Mod: 59

## 2022-03-15 PROCEDURE — 33517 CABG ARTERY-VEIN SINGLE: CPT | Mod: AS

## 2022-03-15 PROCEDURE — 33534 CABG ARTERIAL TWO: CPT

## 2022-03-15 PROCEDURE — 35600 OPEN HRV UXTR ART 1 SGM CAB: CPT

## 2022-03-15 PROCEDURE — 35600 OPEN HRV UXTR ART 1 SGM CAB: CPT | Mod: AS

## 2022-03-15 PROCEDURE — 93010 ELECTROCARDIOGRAM REPORT: CPT

## 2022-03-15 PROCEDURE — 71045 X-RAY EXAM CHEST 1 VIEW: CPT | Mod: 26

## 2022-03-15 DEVICE — CATH VENTRICULAR PVC 2 POS LSP 18FR: Type: IMPLANTABLE DEVICE | Status: FUNCTIONAL

## 2022-03-15 DEVICE — SYS EXCLUSION LAA DISP ATRICLIP STD 40MM: Type: IMPLANTABLE DEVICE | Status: FUNCTIONAL

## 2022-03-15 DEVICE — IMPLANTABLE DEVICE: Type: IMPLANTABLE DEVICE | Status: FUNCTIONAL

## 2022-03-15 DEVICE — CATH THERMODIL PACE 7.5FR: Type: IMPLANTABLE DEVICE | Status: FUNCTIONAL

## 2022-03-15 DEVICE — HEARTSTRING III PROXIMAL SEAL SYSTEM: Type: IMPLANTABLE DEVICE | Status: FUNCTIONAL

## 2022-03-15 DEVICE — SYS EXCLUSION LAA DISP ATRICLIP STD 35MM: Type: IMPLANTABLE DEVICE | Status: FUNCTIONAL

## 2022-03-15 DEVICE — PATCH VASC FELT PTFE 4X4IN: Type: IMPLANTABLE DEVICE | Status: FUNCTIONAL

## 2022-03-15 DEVICE — CANNULA VENOUS 11.3X15.3MM: Type: IMPLANTABLE DEVICE | Status: FUNCTIONAL

## 2022-03-15 DEVICE — SYS EXCLUSION LAA ATRICLIP STD 50MM: Type: IMPLANTABLE DEVICE | Status: FUNCTIONAL

## 2022-03-15 DEVICE — CANNULA MEDTRONIC VES 1 WAY 3MMX6.4CM: Type: IMPLANTABLE DEVICE | Status: FUNCTIONAL

## 2022-03-15 DEVICE — KIT A-LINE 1LUM 20GX12CM SAFE KIT: Type: IMPLANTABLE DEVICE | Status: FUNCTIONAL

## 2022-03-15 DEVICE — CARDIOBLATE SURG ABLATION PEN XL: Type: IMPLANTABLE DEVICE | Status: FUNCTIONAL

## 2022-03-15 DEVICE — BIOGLUE 5ML SYR: Type: IMPLANTABLE DEVICE | Status: FUNCTIONAL

## 2022-03-15 DEVICE — SPONGE HSTAT SURGCEL FIBRILLAR 4X4IN: Type: IMPLANTABLE DEVICE | Status: FUNCTIONAL

## 2022-03-15 DEVICE — DEVICE ABL CARDIOBLATE BP2: Type: IMPLANTABLE DEVICE | Status: FUNCTIONAL

## 2022-03-15 DEVICE — CANNULA OPTISITE ARTERIAL PERFUSION 18 FR 8 MM X 18 CM: Type: IMPLANTABLE DEVICE | Status: FUNCTIONAL

## 2022-03-15 DEVICE — CANNULA VENOUS DUAL DRAINAGE LIGHTHOUSE TIP 32 TO 40FR X 37.: Type: IMPLANTABLE DEVICE | Status: FUNCTIONAL

## 2022-03-15 DEVICE — CATH THERMAL OXI SWAN GANZ DILUTION 7.5FR: Type: IMPLANTABLE DEVICE | Status: FUNCTIONAL

## 2022-03-15 DEVICE — CATH INFUS SL 7FR 20CM: Type: IMPLANTABLE DEVICE | Status: FUNCTIONAL

## 2022-03-15 DEVICE — LIGATING CLIPS AESCULAP SMALL WIDE 24: Type: IMPLANTABLE DEVICE | Status: FUNCTIONAL

## 2022-03-15 DEVICE — BONE WAX 2.5GM: Type: IMPLANTABLE DEVICE | Status: FUNCTIONAL

## 2022-03-15 DEVICE — ELECT PACE MYOCARDIAL TEMP ORANGE: Type: IMPLANTABLE DEVICE | Status: FUNCTIONAL

## 2022-03-15 DEVICE — CANNULA OPTISITE ARTERIAL PERFUSION 20 FR 6.7 MM X 24 CM: Type: IMPLANTABLE DEVICE | Status: FUNCTIONAL

## 2022-03-15 DEVICE — OCCL VES INT FLO-RES 2X12MM: Type: IMPLANTABLE DEVICE | Status: FUNCTIONAL

## 2022-03-15 DEVICE — CANNULA SOFT FLOW ART EXT BODY 8MM 24FR: Type: IMPLANTABLE DEVICE | Status: FUNCTIONAL

## 2022-03-15 DEVICE — SPONGE GELFOAM SZ 100 UNCOMPRESSED: Type: IMPLANTABLE DEVICE | Status: FUNCTIONAL

## 2022-03-15 DEVICE — SYS ABLATION CARDIAC GEMINI S: Type: IMPLANTABLE DEVICE | Status: FUNCTIONAL

## 2022-03-15 DEVICE — SPONGE HSAT SURGICEL 6"X9": Type: IMPLANTABLE DEVICE | Status: FUNCTIONAL

## 2022-03-15 DEVICE — SHUNT INTLUMN 2.5X12MM: Type: IMPLANTABLE DEVICE | Status: FUNCTIONAL

## 2022-03-15 DEVICE — CANNULA FEM PERQ 25FR MULTI STAGE VENOUS: Type: IMPLANTABLE DEVICE | Status: FUNCTIONAL

## 2022-03-15 DEVICE — CATH CARDIOPLEGIA RETROGRADE: Type: IMPLANTABLE DEVICE | Status: FUNCTIONAL

## 2022-03-15 DEVICE — CANNULA SOFT FLOW ART EXT BODY 7MM 21FR: Type: IMPLANTABLE DEVICE | Status: FUNCTIONAL

## 2022-03-15 DEVICE — SEALANT FLOSEAL FAST PREP HEMOSTATIC MATRIX 10ML: Type: IMPLANTABLE DEVICE | Status: FUNCTIONAL

## 2022-03-15 DEVICE — SHUNT INTLUMN 1.5X12MM: Type: IMPLANTABLE DEVICE | Status: FUNCTIONAL

## 2022-03-15 DEVICE — SHUNT INTLUMN FLO-THRU 2X12MM: Type: IMPLANTABLE DEVICE | Status: FUNCTIONAL

## 2022-03-15 DEVICE — SUT TEMP PACING WIRE 2-0 24IN BB1SKS3 DA BLU: Type: IMPLANTABLE DEVICE | Status: FUNCTIONAL

## 2022-03-15 DEVICE — KIT CVC 2LUM MAC 9FR CHG: Type: IMPLANTABLE DEVICE | Status: FUNCTIONAL

## 2022-03-15 DEVICE — CANNULA FEM PERF 21FR: Type: IMPLANTABLE DEVICE | Status: FUNCTIONAL

## 2022-03-15 DEVICE — INTRO SHEATH PINN PERIPH 6X10 MINI GWIRE: Type: IMPLANTABLE DEVICE | Status: FUNCTIONAL

## 2022-03-15 DEVICE — CANNULA IMA BULB TIP 1MMX4.4CM: Type: IMPLANTABLE DEVICE | Status: FUNCTIONAL

## 2022-03-15 DEVICE — M25 WHT WIRE PACING TEMP: Type: IMPLANTABLE DEVICE | Status: FUNCTIONAL

## 2022-03-15 DEVICE — SYS EXCLUSION LAA DISP ATRICLIP STD 45MM: Type: IMPLANTABLE DEVICE | Status: FUNCTIONAL

## 2022-03-15 DEVICE — OCCL VES INT FLO-RES 1X12MM: Type: IMPLANTABLE DEVICE | Status: FUNCTIONAL

## 2022-03-15 DEVICE — KIT INSERTION PERCUTANEOUS: Type: IMPLANTABLE DEVICE | Status: FUNCTIONAL

## 2022-03-15 DEVICE — OCCL VES INT FLO-RES 2.5X12MM: Type: IMPLANTABLE DEVICE | Status: FUNCTIONAL

## 2022-03-15 DEVICE — AGENT HEMOSTATIC HEMOBLAST 1.65G 10CM: Type: IMPLANTABLE DEVICE | Status: FUNCTIONAL

## 2022-03-15 DEVICE — SHUNT E/OTH2U: Type: IMPLANTABLE DEVICE | Status: FUNCTIONAL

## 2022-03-15 DEVICE — SPONGE HSTAT SURGICEL 2X14": Type: IMPLANTABLE DEVICE | Status: FUNCTIONAL

## 2022-03-15 DEVICE — CANNULA OPTISITE ARTERIAL 16F: Type: IMPLANTABLE DEVICE | Status: FUNCTIONAL

## 2022-03-15 DEVICE — HEMOSTAT ARISTA AH 5GM: Type: IMPLANTABLE DEVICE | Status: FUNCTIONAL

## 2022-03-15 DEVICE — CATH THOR 32FR 21IN: Type: IMPLANTABLE DEVICE | Status: FUNCTIONAL

## 2022-03-15 DEVICE — SHUNT INTLUMN 3X12MM: Type: IMPLANTABLE DEVICE | Status: FUNCTIONAL

## 2022-03-15 DEVICE — CATH THOR RT ANG 28FR: Type: IMPLANTABLE DEVICE | Status: FUNCTIONAL

## 2022-03-15 DEVICE — KIT A-LINE 1LUM 18GAX16CM: Type: IMPLANTABLE DEVICE | Status: FUNCTIONAL

## 2022-03-15 DEVICE — PATCH VASC FELT PTFE 6X6IN: Type: IMPLANTABLE DEVICE | Status: FUNCTIONAL

## 2022-03-15 DEVICE — CANNULA AOR ROOT 9FRX14CM 20/CA: Type: IMPLANTABLE DEVICE | Status: FUNCTIONAL

## 2022-03-15 DEVICE — CANNULA AOR ROOT FLNG 7FRX14CM: Type: IMPLANTABLE DEVICE | Status: FUNCTIONAL

## 2022-03-15 RX ORDER — CHLORHEXIDINE GLUCONATE 213 G/1000ML
5 SOLUTION TOPICAL
Refills: 0 | Status: DISCONTINUED | OUTPATIENT
Start: 2022-03-15 | End: 2022-03-17

## 2022-03-15 RX ORDER — SENNA PLUS 8.6 MG/1
2 TABLET ORAL AT BEDTIME
Refills: 0 | Status: DISCONTINUED | OUTPATIENT
Start: 2022-03-15 | End: 2022-03-20

## 2022-03-15 RX ORDER — CHLORHEXIDINE GLUCONATE 213 G/1000ML
1 SOLUTION TOPICAL DAILY
Refills: 0 | Status: DISCONTINUED | OUTPATIENT
Start: 2022-03-15 | End: 2022-03-17

## 2022-03-15 RX ORDER — ALBUMIN HUMAN 25 %
250 VIAL (ML) INTRAVENOUS ONCE
Refills: 0 | Status: COMPLETED | OUTPATIENT
Start: 2022-03-15 | End: 2022-03-15

## 2022-03-15 RX ORDER — PANTOPRAZOLE SODIUM 20 MG/1
40 TABLET, DELAYED RELEASE ORAL ONCE
Refills: 0 | Status: COMPLETED | OUTPATIENT
Start: 2022-03-15 | End: 2022-03-15

## 2022-03-15 RX ORDER — DILTIAZEM HCL 120 MG
4 CAPSULE, EXT RELEASE 24 HR ORAL
Qty: 125 | Refills: 0 | Status: DISCONTINUED | OUTPATIENT
Start: 2022-03-15 | End: 2022-03-16

## 2022-03-15 RX ORDER — HYDROMORPHONE HYDROCHLORIDE 2 MG/ML
0.25 INJECTION INTRAMUSCULAR; INTRAVENOUS; SUBCUTANEOUS ONCE
Refills: 0 | Status: DISCONTINUED | OUTPATIENT
Start: 2022-03-15 | End: 2022-03-15

## 2022-03-15 RX ORDER — SODIUM CHLORIDE 9 MG/ML
1000 INJECTION, SOLUTION INTRAVENOUS ONCE
Refills: 0 | Status: COMPLETED | OUTPATIENT
Start: 2022-03-15 | End: 2022-03-15

## 2022-03-15 RX ORDER — DEXMEDETOMIDINE HYDROCHLORIDE IN 0.9% SODIUM CHLORIDE 4 UG/ML
0.3 INJECTION INTRAVENOUS
Qty: 200 | Refills: 0 | Status: DISCONTINUED | OUTPATIENT
Start: 2022-03-15 | End: 2022-03-16

## 2022-03-15 RX ORDER — POTASSIUM CHLORIDE 20 MEQ
10 PACKET (EA) ORAL
Refills: 0 | Status: DISCONTINUED | OUTPATIENT
Start: 2022-03-15 | End: 2022-03-16

## 2022-03-15 RX ORDER — PANTOPRAZOLE SODIUM 20 MG/1
40 TABLET, DELAYED RELEASE ORAL DAILY
Refills: 0 | Status: DISCONTINUED | OUTPATIENT
Start: 2022-03-16 | End: 2022-03-20

## 2022-03-15 RX ORDER — DEXTROSE 50 % IN WATER 50 %
50 SYRINGE (ML) INTRAVENOUS
Refills: 0 | Status: DISCONTINUED | OUTPATIENT
Start: 2022-03-15 | End: 2022-03-16

## 2022-03-15 RX ORDER — SODIUM CHLORIDE 9 MG/ML
1000 INJECTION INTRAMUSCULAR; INTRAVENOUS; SUBCUTANEOUS
Refills: 0 | Status: DISCONTINUED | OUTPATIENT
Start: 2022-03-15 | End: 2022-03-17

## 2022-03-15 RX ORDER — ASPIRIN/CALCIUM CARB/MAGNESIUM 324 MG
81 TABLET ORAL ONCE
Refills: 0 | Status: DISCONTINUED | OUTPATIENT
Start: 2022-03-15 | End: 2022-03-15

## 2022-03-15 RX ORDER — CEFUROXIME AXETIL 250 MG
1500 TABLET ORAL EVERY 8 HOURS
Refills: 0 | Status: COMPLETED | OUTPATIENT
Start: 2022-03-15 | End: 2022-03-17

## 2022-03-15 RX ORDER — MAGNESIUM SULFATE 500 MG/ML
2 VIAL (ML) INJECTION ONCE
Refills: 0 | Status: COMPLETED | OUTPATIENT
Start: 2022-03-15 | End: 2022-03-15

## 2022-03-15 RX ORDER — ATORVASTATIN CALCIUM 80 MG/1
80 TABLET, FILM COATED ORAL AT BEDTIME
Refills: 0 | Status: DISCONTINUED | OUTPATIENT
Start: 2022-03-15 | End: 2022-03-20

## 2022-03-15 RX ORDER — POTASSIUM CHLORIDE 20 MEQ
10 PACKET (EA) ORAL
Refills: 0 | Status: COMPLETED | OUTPATIENT
Start: 2022-03-15 | End: 2022-03-15

## 2022-03-15 RX ORDER — VANCOMYCIN HCL 1 G
1500 VIAL (EA) INTRAVENOUS EVERY 12 HOURS
Refills: 0 | Status: COMPLETED | OUTPATIENT
Start: 2022-03-15 | End: 2022-03-17

## 2022-03-15 RX ORDER — INSULIN HUMAN 100 [IU]/ML
1 INJECTION, SOLUTION SUBCUTANEOUS
Qty: 50 | Refills: 0 | Status: DISCONTINUED | OUTPATIENT
Start: 2022-03-15 | End: 2022-03-16

## 2022-03-15 RX ORDER — DEXTROSE 50 % IN WATER 50 %
25 SYRINGE (ML) INTRAVENOUS
Refills: 0 | Status: DISCONTINUED | OUTPATIENT
Start: 2022-03-15 | End: 2022-03-16

## 2022-03-15 RX ORDER — FENTANYL CITRATE 50 UG/ML
50 INJECTION INTRAVENOUS
Refills: 0 | Status: DISCONTINUED | OUTPATIENT
Start: 2022-03-15 | End: 2022-03-15

## 2022-03-15 RX ORDER — ASPIRIN/CALCIUM CARB/MAGNESIUM 324 MG
81 TABLET ORAL ONCE
Refills: 0 | Status: COMPLETED | OUTPATIENT
Start: 2022-03-15 | End: 2022-03-15

## 2022-03-15 RX ORDER — SODIUM CHLORIDE 9 MG/ML
500 INJECTION, SOLUTION INTRAVENOUS ONCE
Refills: 0 | Status: COMPLETED | OUTPATIENT
Start: 2022-03-15 | End: 2022-03-15

## 2022-03-15 RX ORDER — FENTANYL CITRATE 50 UG/ML
50 INJECTION INTRAVENOUS ONCE
Refills: 0 | Status: DISCONTINUED | OUTPATIENT
Start: 2022-03-15 | End: 2022-03-15

## 2022-03-15 RX ORDER — NOREPINEPHRINE BITARTRATE/D5W 8 MG/250ML
0.05 PLASTIC BAG, INJECTION (ML) INTRAVENOUS
Qty: 8 | Refills: 0 | Status: DISCONTINUED | OUTPATIENT
Start: 2022-03-15 | End: 2022-03-16

## 2022-03-15 RX ORDER — ASPIRIN/CALCIUM CARB/MAGNESIUM 324 MG
81 TABLET ORAL DAILY
Refills: 0 | Status: DISCONTINUED | OUTPATIENT
Start: 2022-03-16 | End: 2022-03-20

## 2022-03-15 RX ADMIN — Medication 100 MILLIEQUIVALENT(S): at 20:00

## 2022-03-15 RX ADMIN — FENTANYL CITRATE 50 MICROGRAM(S): 50 INJECTION INTRAVENOUS at 18:00

## 2022-03-15 RX ADMIN — CHLORHEXIDINE GLUCONATE 5 MILLILITER(S): 213 SOLUTION TOPICAL at 20:55

## 2022-03-15 RX ADMIN — Medication 100 MILLIGRAM(S): at 23:33

## 2022-03-15 RX ADMIN — FENTANYL CITRATE 50 MICROGRAM(S): 50 INJECTION INTRAVENOUS at 17:16

## 2022-03-15 RX ADMIN — Medication 300 MILLIGRAM(S): at 21:23

## 2022-03-15 RX ADMIN — CHLORHEXIDINE GLUCONATE 1 APPLICATION(S): 213 SOLUTION TOPICAL at 04:55

## 2022-03-15 RX ADMIN — PANTOPRAZOLE SODIUM 40 MILLIGRAM(S): 20 TABLET, DELAYED RELEASE ORAL at 04:05

## 2022-03-15 RX ADMIN — HYDROMORPHONE HYDROCHLORIDE 0.25 MILLIGRAM(S): 2 INJECTION INTRAMUSCULAR; INTRAVENOUS; SUBCUTANEOUS at 21:00

## 2022-03-15 RX ADMIN — INSULIN HUMAN 1 UNIT(S)/HR: 100 INJECTION, SOLUTION SUBCUTANEOUS at 17:41

## 2022-03-15 RX ADMIN — HYDROMORPHONE HYDROCHLORIDE 0.25 MILLIGRAM(S): 2 INJECTION INTRAMUSCULAR; INTRAVENOUS; SUBCUTANEOUS at 21:23

## 2022-03-15 RX ADMIN — Medication 125 MILLILITER(S): at 17:17

## 2022-03-15 RX ADMIN — FENTANYL CITRATE 50 MICROGRAM(S): 50 INJECTION INTRAVENOUS at 16:30

## 2022-03-15 RX ADMIN — Medication 8.5 MICROGRAM(S)/KG/MIN: at 16:18

## 2022-03-15 RX ADMIN — SODIUM CHLORIDE 500 MILLILITER(S): 9 INJECTION, SOLUTION INTRAVENOUS at 23:33

## 2022-03-15 RX ADMIN — CHLORHEXIDINE GLUCONATE 15 MILLILITER(S): 213 SOLUTION TOPICAL at 04:03

## 2022-03-15 RX ADMIN — FENTANYL CITRATE 50 MICROGRAM(S): 50 INJECTION INTRAVENOUS at 17:29

## 2022-03-15 RX ADMIN — SENNA PLUS 2 TABLET(S): 8.6 TABLET ORAL at 21:22

## 2022-03-15 RX ADMIN — Medication 100 MILLIGRAM(S): at 18:29

## 2022-03-15 RX ADMIN — Medication 81 MILLIGRAM(S): at 21:23

## 2022-03-15 RX ADMIN — CHLORHEXIDINE GLUCONATE 1 APPLICATION(S): 213 SOLUTION TOPICAL at 21:24

## 2022-03-15 RX ADMIN — SODIUM CHLORIDE 2000 MILLILITER(S): 9 INJECTION, SOLUTION INTRAVENOUS at 18:50

## 2022-03-15 RX ADMIN — Medication 100 MILLIEQUIVALENT(S): at 18:29

## 2022-03-15 RX ADMIN — SODIUM CHLORIDE 500 MILLILITER(S): 9 INJECTION, SOLUTION INTRAVENOUS at 20:55

## 2022-03-15 RX ADMIN — Medication 25 GRAM(S): at 04:04

## 2022-03-15 RX ADMIN — DEXMEDETOMIDINE HYDROCHLORIDE IN 0.9% SODIUM CHLORIDE 6.8 MICROGRAM(S)/KG/HR: 4 INJECTION INTRAVENOUS at 17:29

## 2022-03-15 RX ADMIN — DEXMEDETOMIDINE HYDROCHLORIDE IN 0.9% SODIUM CHLORIDE 6.8 MICROGRAM(S)/KG/HR: 4 INJECTION INTRAVENOUS at 21:21

## 2022-03-15 RX ADMIN — Medication 100 MILLIEQUIVALENT(S): at 17:34

## 2022-03-15 RX ADMIN — PANTOPRAZOLE SODIUM 40 MILLIGRAM(S): 20 TABLET, DELAYED RELEASE ORAL at 17:19

## 2022-03-15 RX ADMIN — ATORVASTATIN CALCIUM 80 MILLIGRAM(S): 80 TABLET, FILM COATED ORAL at 21:23

## 2022-03-15 NOTE — BRIEF OPERATIVE NOTE - NSICDXBRIEFPROCEDURE_GEN_ALL_CORE_FT
PROCEDURES:  CABG, 2 arterial and 1 venous 15-Mar-2022 14:07:41 CABG x 3 Utilizing the Left Internal Mammary Artery, Endoscopically Harvested Right Radial Artery & Endoscopically Harvested Greater Saphenous Vein Graft form the Right Thigh) Dwain Martínez   PROCEDURES:  CABG, 2 arterial and 1 venous 15-Mar-2022 14:07:41 CABG x 3 Utilizing the Left Internal Mammary Artery, Endoscopically Harvested Right Radial Artery & Endoscopically Harvested Greater Saphenous Vein Graft form the Right Thigh Dwain Martínez  Clipping of left atrial appendage 15-Mar-2022 15:56:52  Dwain Martínez

## 2022-03-15 NOTE — BRIEF OPERATIVE NOTE - OPERATION/FINDINGS
Endoscopically Harvested Right Radial [place] Converted to Open [place] Performed and Closed by TRACE Bob  (LIMA-LAD, Radial-?, SVG-?) Endoscopically Harvested Right Radial [place] Converted to Open [place] Performed and Closed by TRACE Bob  (LIMA-LAD, Radial-Ramus, SVG-OM2) Endoscopically Harvested Right Radial [place] Converted to Open [place] Performed and Closed by TRACE Gonzalez  (LIMA-LAD, Radial-Ramus, SVG-OM2)

## 2022-03-15 NOTE — CHART NOTE - NSCHARTNOTEFT_GEN_A_CORE
Left radial not suitable for use as bypass conduit due to anatomical variant on ultrasound (artery not continuous).  Right radial Rudi's test <5 seconds radial and ulnar release  Baseline perfusion index 2.6, radial compression 1.9.  Right radial suitable for use as bypass conduit.

## 2022-03-15 NOTE — BRIEF OPERATIVE NOTE - COMMENTS
No qualified resident was available to assist in this case. Sonali OJEDA have personally first assisted the Cardiothoracic Surgeon listed in this brief op note throughout the entirety of this case.  Cell Saver Utilized - Unable to Quantify Blood Loss  Invasive Lines: Right Internal Jugular Halfway, Left Radial Arterial Line  IV Medication Infusions: ? No qualified resident was available to assist in this case. Sonali OJEDA have personally first assisted the Cardiothoracic Surgeon listed in this brief op note throughout the entirety of this case.  Cell Saver Utilized - Unable to Quantify Blood Loss  Invasive Lines: Right Internal Jugular Bow[place], Left Radial Arterial Line  IV Medication Infusions: Levophed, Diltiazem

## 2022-03-16 DIAGNOSIS — Z95.1 PRESENCE OF AORTOCORONARY BYPASS GRAFT: ICD-10-CM

## 2022-03-16 LAB
ALBUMIN SERPL ELPH-MCNC: 3.2 G/DL — LOW (ref 3.3–5.2)
ALP SERPL-CCNC: 37 U/L — LOW (ref 40–120)
ALT FLD-CCNC: 12 U/L — SIGNIFICANT CHANGE UP
ANION GAP SERPL CALC-SCNC: 11 MMOL/L — SIGNIFICANT CHANGE UP (ref 5–17)
AST SERPL-CCNC: 32 U/L — SIGNIFICANT CHANGE UP
BASOPHILS # BLD AUTO: 0.01 K/UL — SIGNIFICANT CHANGE UP (ref 0–0.2)
BASOPHILS NFR BLD AUTO: 0.1 % — SIGNIFICANT CHANGE UP (ref 0–2)
BILIRUB SERPL-MCNC: 0.4 MG/DL — SIGNIFICANT CHANGE UP (ref 0.4–2)
BUN SERPL-MCNC: 16.6 MG/DL — SIGNIFICANT CHANGE UP (ref 8–20)
CALCIUM SERPL-MCNC: 7.4 MG/DL — LOW (ref 8.6–10.2)
CHLORIDE SERPL-SCNC: 108 MMOL/L — HIGH (ref 98–107)
CK MB CFR SERPL CALC: 19.1 NG/ML — HIGH (ref 0–6.7)
CK SERPL-CCNC: 813 U/L — HIGH (ref 30–200)
CO2 SERPL-SCNC: 21 MMOL/L — LOW (ref 22–29)
CREAT SERPL-MCNC: 0.74 MG/DL — SIGNIFICANT CHANGE UP (ref 0.5–1.3)
EGFR: 100 ML/MIN/1.73M2 — SIGNIFICANT CHANGE UP
EOSINOPHIL # BLD AUTO: 0 K/UL — SIGNIFICANT CHANGE UP (ref 0–0.5)
EOSINOPHIL NFR BLD AUTO: 0 % — SIGNIFICANT CHANGE UP (ref 0–6)
GAS PNL BLDA: SIGNIFICANT CHANGE UP
GLUCOSE BLDC GLUCOMTR-MCNC: 109 MG/DL — HIGH (ref 70–99)
GLUCOSE BLDC GLUCOMTR-MCNC: 125 MG/DL — HIGH (ref 70–99)
GLUCOSE BLDC GLUCOMTR-MCNC: 130 MG/DL — HIGH (ref 70–99)
GLUCOSE BLDC GLUCOMTR-MCNC: 139 MG/DL — HIGH (ref 70–99)
GLUCOSE BLDC GLUCOMTR-MCNC: 157 MG/DL — HIGH (ref 70–99)
GLUCOSE BLDC GLUCOMTR-MCNC: 164 MG/DL — HIGH (ref 70–99)
GLUCOSE BLDC GLUCOMTR-MCNC: 173 MG/DL — HIGH (ref 70–99)
GLUCOSE SERPL-MCNC: 130 MG/DL — HIGH (ref 70–99)
HCT VFR BLD CALC: 25.1 % — LOW (ref 39–50)
HCT VFR BLD CALC: 27.1 % — LOW (ref 39–50)
HGB BLD-MCNC: 8.3 G/DL — LOW (ref 13–17)
HGB BLD-MCNC: 9 G/DL — LOW (ref 13–17)
IMM GRANULOCYTES NFR BLD AUTO: 0.9 % — SIGNIFICANT CHANGE UP (ref 0–1.5)
LYMPHOCYTES # BLD AUTO: 0.66 K/UL — LOW (ref 1–3.3)
LYMPHOCYTES # BLD AUTO: 4.4 % — LOW (ref 13–44)
MAGNESIUM SERPL-MCNC: 2 MG/DL — SIGNIFICANT CHANGE UP (ref 1.6–2.6)
MCHC RBC-ENTMCNC: 29.1 PG — SIGNIFICANT CHANGE UP (ref 27–34)
MCHC RBC-ENTMCNC: 29.4 PG — SIGNIFICANT CHANGE UP (ref 27–34)
MCHC RBC-ENTMCNC: 33.1 GM/DL — SIGNIFICANT CHANGE UP (ref 32–36)
MCHC RBC-ENTMCNC: 33.2 GM/DL — SIGNIFICANT CHANGE UP (ref 32–36)
MCV RBC AUTO: 87.7 FL — SIGNIFICANT CHANGE UP (ref 80–100)
MCV RBC AUTO: 89 FL — SIGNIFICANT CHANGE UP (ref 80–100)
MONOCYTES # BLD AUTO: 1.61 K/UL — HIGH (ref 0–0.9)
MONOCYTES NFR BLD AUTO: 10.7 % — SIGNIFICANT CHANGE UP (ref 2–14)
NEUTROPHILS # BLD AUTO: 12.58 K/UL — HIGH (ref 1.8–7.4)
NEUTROPHILS NFR BLD AUTO: 83.9 % — HIGH (ref 43–77)
PLATELET # BLD AUTO: 150 K/UL — SIGNIFICANT CHANGE UP (ref 150–400)
PLATELET # BLD AUTO: 159 K/UL — SIGNIFICANT CHANGE UP (ref 150–400)
POTASSIUM SERPL-MCNC: 4.1 MMOL/L — SIGNIFICANT CHANGE UP (ref 3.5–5.3)
POTASSIUM SERPL-SCNC: 4.1 MMOL/L — SIGNIFICANT CHANGE UP (ref 3.5–5.3)
PROT SERPL-MCNC: 4.7 G/DL — LOW (ref 6.6–8.7)
RBC # BLD: 2.82 M/UL — LOW (ref 4.2–5.8)
RBC # BLD: 3.09 M/UL — LOW (ref 4.2–5.8)
RBC # FLD: 13.1 % — SIGNIFICANT CHANGE UP (ref 10.3–14.5)
RBC # FLD: 13.4 % — SIGNIFICANT CHANGE UP (ref 10.3–14.5)
SODIUM SERPL-SCNC: 140 MMOL/L — SIGNIFICANT CHANGE UP (ref 135–145)
TROPONIN T SERPL-MCNC: 0.23 NG/ML — HIGH (ref 0–0.06)
WBC # BLD: 14.99 K/UL — HIGH (ref 3.8–10.5)
WBC # BLD: 18.99 K/UL — HIGH (ref 3.8–10.5)
WBC # FLD AUTO: 14.99 K/UL — HIGH (ref 3.8–10.5)
WBC # FLD AUTO: 18.99 K/UL — HIGH (ref 3.8–10.5)

## 2022-03-16 PROCEDURE — 93010 ELECTROCARDIOGRAM REPORT: CPT

## 2022-03-16 PROCEDURE — 99024 POSTOP FOLLOW-UP VISIT: CPT

## 2022-03-16 PROCEDURE — 71045 X-RAY EXAM CHEST 1 VIEW: CPT | Mod: 26

## 2022-03-16 PROCEDURE — 71045 X-RAY EXAM CHEST 1 VIEW: CPT | Mod: 26,77

## 2022-03-16 RX ORDER — INSULIN LISPRO 100/ML
VIAL (ML) SUBCUTANEOUS
Refills: 0 | Status: DISCONTINUED | OUTPATIENT
Start: 2022-03-16 | End: 2022-03-18

## 2022-03-16 RX ORDER — HYDROMORPHONE HYDROCHLORIDE 2 MG/ML
0.5 INJECTION INTRAMUSCULAR; INTRAVENOUS; SUBCUTANEOUS ONCE
Refills: 0 | Status: DISCONTINUED | OUTPATIENT
Start: 2022-03-16 | End: 2022-03-16

## 2022-03-16 RX ORDER — POLYETHYLENE GLYCOL 3350 17 G/17G
17 POWDER, FOR SOLUTION ORAL DAILY
Refills: 0 | Status: DISCONTINUED | OUTPATIENT
Start: 2022-03-16 | End: 2022-03-20

## 2022-03-16 RX ORDER — INSULIN LISPRO 100/ML
4 VIAL (ML) SUBCUTANEOUS
Refills: 0 | Status: DISCONTINUED | OUTPATIENT
Start: 2022-03-16 | End: 2022-03-16

## 2022-03-16 RX ORDER — METOPROLOL TARTRATE 50 MG
25 TABLET ORAL
Refills: 0 | Status: DISCONTINUED | OUTPATIENT
Start: 2022-03-16 | End: 2022-03-20

## 2022-03-16 RX ORDER — HYDROMORPHONE HYDROCHLORIDE 2 MG/ML
0.25 INJECTION INTRAMUSCULAR; INTRAVENOUS; SUBCUTANEOUS ONCE
Refills: 0 | Status: DISCONTINUED | OUTPATIENT
Start: 2022-03-16 | End: 2022-03-16

## 2022-03-16 RX ORDER — OXYCODONE HYDROCHLORIDE 5 MG/1
5 TABLET ORAL EVERY 4 HOURS
Refills: 0 | Status: DISCONTINUED | OUTPATIENT
Start: 2022-03-16 | End: 2022-03-20

## 2022-03-16 RX ORDER — AMLODIPINE BESYLATE 2.5 MG/1
2.5 TABLET ORAL DAILY
Refills: 0 | Status: DISCONTINUED | OUTPATIENT
Start: 2022-03-16 | End: 2022-03-20

## 2022-03-16 RX ORDER — ACETAMINOPHEN 500 MG
975 TABLET ORAL EVERY 6 HOURS
Refills: 0 | Status: COMPLETED | OUTPATIENT
Start: 2022-03-16 | End: 2022-03-17

## 2022-03-16 RX ORDER — OXYCODONE HYDROCHLORIDE 5 MG/1
10 TABLET ORAL EVERY 4 HOURS
Refills: 0 | Status: DISCONTINUED | OUTPATIENT
Start: 2022-03-16 | End: 2022-03-20

## 2022-03-16 RX ORDER — ONDANSETRON 8 MG/1
4 TABLET, FILM COATED ORAL ONCE
Refills: 0 | Status: COMPLETED | OUTPATIENT
Start: 2022-03-16 | End: 2022-03-16

## 2022-03-16 RX ORDER — FENTANYL CITRATE 50 UG/ML
25 INJECTION INTRAVENOUS ONCE
Refills: 0 | Status: DISCONTINUED | OUTPATIENT
Start: 2022-03-16 | End: 2022-03-16

## 2022-03-16 RX ORDER — ALBUMIN HUMAN 25 %
250 VIAL (ML) INTRAVENOUS ONCE
Refills: 0 | Status: COMPLETED | OUTPATIENT
Start: 2022-03-16 | End: 2022-03-16

## 2022-03-16 RX ORDER — MAGNESIUM SULFATE 500 MG/ML
2 VIAL (ML) INJECTION ONCE
Refills: 0 | Status: COMPLETED | OUTPATIENT
Start: 2022-03-16 | End: 2022-03-16

## 2022-03-16 RX ORDER — ENOXAPARIN SODIUM 100 MG/ML
40 INJECTION SUBCUTANEOUS EVERY 24 HOURS
Refills: 0 | Status: DISCONTINUED | OUTPATIENT
Start: 2022-03-16 | End: 2022-03-20

## 2022-03-16 RX ORDER — METOCLOPRAMIDE HCL 10 MG
10 TABLET ORAL EVERY 8 HOURS
Refills: 0 | Status: COMPLETED | OUTPATIENT
Start: 2022-03-16 | End: 2022-03-16

## 2022-03-16 RX ADMIN — Medication 2: at 22:12

## 2022-03-16 RX ADMIN — ATORVASTATIN CALCIUM 80 MILLIGRAM(S): 80 TABLET, FILM COATED ORAL at 21:59

## 2022-03-16 RX ADMIN — Medication 81 MILLIGRAM(S): at 11:21

## 2022-03-16 RX ADMIN — ENOXAPARIN SODIUM 40 MILLIGRAM(S): 100 INJECTION SUBCUTANEOUS at 14:21

## 2022-03-16 RX ADMIN — PANTOPRAZOLE SODIUM 40 MILLIGRAM(S): 20 TABLET, DELAYED RELEASE ORAL at 11:21

## 2022-03-16 RX ADMIN — CHLORHEXIDINE GLUCONATE 1 APPLICATION(S): 213 SOLUTION TOPICAL at 14:23

## 2022-03-16 RX ADMIN — POLYETHYLENE GLYCOL 3350 17 GRAM(S): 17 POWDER, FOR SOLUTION ORAL at 11:21

## 2022-03-16 RX ADMIN — HYDROMORPHONE HYDROCHLORIDE 0.5 MILLIGRAM(S): 2 INJECTION INTRAMUSCULAR; INTRAVENOUS; SUBCUTANEOUS at 09:34

## 2022-03-16 RX ADMIN — AMLODIPINE BESYLATE 2.5 MILLIGRAM(S): 2.5 TABLET ORAL at 08:20

## 2022-03-16 RX ADMIN — Medication 975 MILLIGRAM(S): at 21:36

## 2022-03-16 RX ADMIN — CHLORHEXIDINE GLUCONATE 5 MILLILITER(S): 213 SOLUTION TOPICAL at 06:31

## 2022-03-16 RX ADMIN — HYDROMORPHONE HYDROCHLORIDE 0.5 MILLIGRAM(S): 2 INJECTION INTRAMUSCULAR; INTRAVENOUS; SUBCUTANEOUS at 10:00

## 2022-03-16 RX ADMIN — Medication 125 MILLILITER(S): at 11:00

## 2022-03-16 RX ADMIN — HYDROMORPHONE HYDROCHLORIDE 0.5 MILLIGRAM(S): 2 INJECTION INTRAMUSCULAR; INTRAVENOUS; SUBCUTANEOUS at 16:17

## 2022-03-16 RX ADMIN — ONDANSETRON 4 MILLIGRAM(S): 8 TABLET, FILM COATED ORAL at 09:27

## 2022-03-16 RX ADMIN — Medication 300 MILLIGRAM(S): at 21:59

## 2022-03-16 RX ADMIN — Medication 2: at 11:21

## 2022-03-16 RX ADMIN — HYDROMORPHONE HYDROCHLORIDE 0.5 MILLIGRAM(S): 2 INJECTION INTRAMUSCULAR; INTRAVENOUS; SUBCUTANEOUS at 16:00

## 2022-03-16 RX ADMIN — Medication 300 MILLIGRAM(S): at 09:22

## 2022-03-16 RX ADMIN — Medication 25 MILLIGRAM(S): at 21:59

## 2022-03-16 RX ADMIN — OXYCODONE HYDROCHLORIDE 10 MILLIGRAM(S): 5 TABLET ORAL at 21:36

## 2022-03-16 RX ADMIN — Medication 10 MILLIGRAM(S): at 22:00

## 2022-03-16 RX ADMIN — SENNA PLUS 2 TABLET(S): 8.6 TABLET ORAL at 21:59

## 2022-03-16 RX ADMIN — Medication 10 MILLIGRAM(S): at 14:26

## 2022-03-16 RX ADMIN — HYDROMORPHONE HYDROCHLORIDE 0.25 MILLIGRAM(S): 2 INJECTION INTRAMUSCULAR; INTRAVENOUS; SUBCUTANEOUS at 06:32

## 2022-03-16 RX ADMIN — Medication 100 MILLIGRAM(S): at 16:16

## 2022-03-16 RX ADMIN — OXYCODONE HYDROCHLORIDE 10 MILLIGRAM(S): 5 TABLET ORAL at 19:54

## 2022-03-16 RX ADMIN — ONDANSETRON 4 MILLIGRAM(S): 8 TABLET, FILM COATED ORAL at 06:30

## 2022-03-16 RX ADMIN — Medication 25 GRAM(S): at 17:58

## 2022-03-16 RX ADMIN — Medication 2: at 16:17

## 2022-03-16 RX ADMIN — Medication 975 MILLIGRAM(S): at 19:54

## 2022-03-16 RX ADMIN — HYDROMORPHONE HYDROCHLORIDE 0.25 MILLIGRAM(S): 2 INJECTION INTRAMUSCULAR; INTRAVENOUS; SUBCUTANEOUS at 05:39

## 2022-03-16 RX ADMIN — Medication 100 MILLIGRAM(S): at 08:20

## 2022-03-16 NOTE — AIRWAY REMOVAL NOTE  ADULT & PEDS - ARTIFICAL AIRWAY REMOVAL COMMENTS
Written order for extubation verified. The patient was identified by full name and birth date compared to the identification band. Present during the procedure was JOHN Torres and RT. Renata Badillo

## 2022-03-16 NOTE — CONSULT NOTE ADULT - PROBLEM SELECTOR RECOMMENDATION 9
continue with aspirin, statin   once BP allows it suggest small dose of metoprolol and ACEi  will continue to follow.

## 2022-03-16 NOTE — CONSULT NOTE ADULT - ASSESSMENT
66M with hx of HTN. HLD, vamshi reduced LVEF, who came electively for a C was found to have severe 3VD CAD, was surgically revascularized with CABG  (LIMA-LAD, Radial-Ramus, SVG-OM2), Cardiology service called for co-management in the postoperative stay. Patient was seen in a chair, in pain. He denies shortness of breath, no palpitations.

## 2022-03-16 NOTE — PROGRESS NOTE ADULT - SUBJECTIVE AND OBJECTIVE BOX
Patient states "I feel better now that the tube is out of my throat".   Patient denies CP, SOB, N/V/D, Fever or Chills    ICU Vital Signs Last 24 Hrs  T(C): 37 (16 Mar 2022 00:00), Max: 37.5 (15 Mar 2022 20:00)  T(F): 98.6 (16 Mar 2022 00:00), Max: 99.5 (15 Mar 2022 20:00)  HR: 68 (16 Mar 2022 02:15) (57 - 71)  BP: 150/80 (15 Mar 2022 06:25) (136/79 - 171/84)  BP(mean): --  ABP: 121/46 (16 Mar 2022 02:15) (96/49 - 154/63)  ABP(mean): 67 (16 Mar 2022 02:15) (62 - 88)  RR: 14 (16 Mar 2022 02:15) (6 - 29)  SpO2: 100% (16 Mar 2022 02:15) (93% - 100%)                          11.8   29.80 )-----------( 216      ( 15 Mar 2022 17:42 )             35.0   03-15    141  |  108<H>  |  15.9  ----------------------------<  174<H>  4.1   |  20.0<L>  |  0.79    Ca    8.3<L>      15 Mar 2022 17:42  Mg     2.2     03-15    TPro  4.9<L>  /  Alb  3.1<L>  /  TBili  0.5  /  DBili  x   /  AST  24  /  ALT  11  /  AlkPhos  49  03-15  I&O's Summary    14 Mar 2022 07:01  -  15 Mar 2022 07:00  --------------------------------------------------------  IN: 120 mL / OUT: 0 mL / NET: 120 mL    15 Mar 2022 07:01  -  16 Mar 2022 02:51  --------------------------------------------------------  IN: 3654.7 mL / OUT: 2165 mL / NET: 1489.7 mL        PHYSICAL EXAM:  Neuro, A+Ox3, DANIELS, FC  CHEST/LUNG: Clear to auscultation bilaterally with decreased breath sounds at bases B/L; No wheezing.  HEART: S1S2+, Regular rate and rhythm; No murmurs.  ABDOMEN: Soft, Nontender, Nondistended  SKIN: MSI CDI, Right EVH site CDI, Right ANDI CDI, CTs in place with no Airleak

## 2022-03-16 NOTE — PHYSICAL THERAPY INITIAL EVALUATION ADULT - GENERAL OBSERVATIONS, REHAB EVAL
Pt. OOB; +monitor, right IJ central, radial BENJAMIN drain.  Left radial a-line, chest tubes, ascencio, pacer box.

## 2022-03-16 NOTE — CONSULT NOTE ADULT - SUBJECTIVE AND OBJECTIVE BOX
CHIEF COMPLAINT: admitted for [facility]     HPI: 66M with hx of HTN. HLD, vamshi reduced LVEF, who came electively for a [facility] was found to have severe 3VD CAD, was surgically revascularized with CABG  (LIMA-LAD, Radial-Ramus, SVG-OM2), Cardiology service called for co-management in the postoperative stay. Patient was seen in a chair, in pain. He denies shortness of breath, no palpitations.      PAST MEDICAL & SURGICAL HISTORY:  HTN (hypertension)    HLD (hyperlipidemia)    H/O carpal tunnel repair    S/P tonsillectomy  S/P CABG (LIMA-LAD, Radial-Ramus, SVG-OM2)        Allergies    naproxen (Unknown)    Intolerances        MEDICATIONS  (STANDING):  albumin human  5% IVPB 250 milliLiter(s) IV Intermittent once  amLODIPine   Tablet 2.5 milliGRAM(s) Oral daily  aspirin  chewable 81 milliGRAM(s) Oral daily  atorvastatin 80 milliGRAM(s) Oral at bedtime  cefuroxime  IVPB 1500 milliGRAM(s) IV Intermittent every 8 hours  chlorhexidine 0.12% Liquid 5 milliLiter(s) Oral Mucosa two times a day  chlorhexidine 2% Cloths 1 Application(s) Topical daily  diltiazem Infusion 4 mG/Hr (4 mL/Hr) IV Continuous <Continuous>  enoxaparin Injectable 40 milliGRAM(s) SubCutaneous every 24 hours  insulin lispro (ADMELOG) corrective regimen sliding scale   SubCutaneous Before meals and at bedtime  metoclopramide Injectable 10 milliGRAM(s) IV Push every 8 hours  norepinephrine Infusion 0.05 MICROgram(s)/kG/Min (8.5 mL/Hr) IV Continuous <Continuous>  pantoprazole    Tablet 40 milliGRAM(s) Oral daily  polyethylene glycol 3350 17 Gram(s) Oral daily  senna 2 Tablet(s) Oral at bedtime  sodium chloride 0.9%. 1000 milliLiter(s) (10 mL/Hr) IV Continuous <Continuous>  sodium chloride 0.9%. 1000 milliLiter(s) (5 mL/Hr) IV Continuous <Continuous>  vancomycin  IVPB 1500 milliGRAM(s) IV Intermittent every 12 hours    MEDICATIONS  (PRN):  acetaminophen     Tablet .. 975 milliGRAM(s) Oral every 6 hours PRN Mild Pain (1 - 3)  oxyCODONE    IR 5 milliGRAM(s) Oral every 4 hours PRN Moderate Pain (4 - 6)  oxyCODONE    IR 10 milliGRAM(s) Oral every 4 hours PRN Severe Pain (7 - 10)      FAMILY HISTORY:    No family history of premature coronary artery disease or sudden cardiac death    SOCIAL HISTORY:  Smoking-  Alcohol-  Illicit Drug use-    REVIEW OF SYSTEMS:  Constitutional: [ ] fever, [ ]weight loss,  [ ]fatigue  Eyes: [ ] visual changes  Respiratory: [ ]shortness of breath;  [ ] cough, [ ]wheezing, [ ]chills, [ ]hemoptysis  Cardiovascular: [x ] chest pain, [ ]palpitations, [ ]dizziness,  [ ]leg swelling [ ]syncope  Gastrointestinal: [ ] abdominal pain, [ ]nausea, [ ]vomiting,  [ ]diarrhea   Genitourinary: [ ] dysuria, [ ] hematuria  Neurologic: [ ] headaches [ ] tremors  [ ] weakness [ ] lightheadedness  Skin: [ ] itching, [ ]burning, [ ] rashes  Endocrine: [ ] heat or cold intolerance  Musculoskeletal: [ ] joint pain or swelling; [ ] muscle, back, or extremity pain  Psychiatric: [ ] depression, [ ]anxiety, [ ]mood swings, or [ ]difficulty sleeping  Hematologic: [ ] easy bruising, [ ] bleeding gums     [ x] positive	  [ ] negative    Vital Signs Last 24 Hrs  T(C): 37.1 (16 Mar 2022 08:00), Max: 37.5 (15 Mar 2022 20:00)  T(F): 98.8 (16 Mar 2022 08:00), Max: 99.5 (15 Mar 2022 20:00)  HR: 79 (16 Mar 2022 10:00) (57 - 82)  BP: --  BP(mean): --  RR: 15 (16 Mar 2022 10:00) (6 - 29)  SpO2: 97% (16 Mar 2022 10:00) (92% - 100%)  I&O's Summary    15 Mar 2022 07:01  -  16 Mar 2022 07:00  --------------------------------------------------------  IN: 3750.7 mL / OUT: 2745 mL / NET: 1005.7 mL    16 Mar 2022 07:01  -  16 Mar 2022 10:12  --------------------------------------------------------  IN: 598 mL / OUT: 305 mL / NET: 293 mL        PHYSICAL EXAM:  General: No acute distress  HEENT: EOMI  Neck:  No JVD  Lungs: rales at bases, CT  Heart: Regular rate and rhythm; No murmurs, rubs, or gallops  Abdomen: soft, non tender, non distended   Extremities: warm, 1+ edema   Nervous system:  Alert & Oriented X3  Psychiatric: Normal affect  Skin: No rashes or lesions    LABS:  03-16    140  |  108<H>  |  16.6  ----------------------------<  130<H>  4.1   |  21.0<L>  |  0.74    Ca    7.4<L>      16 Mar 2022 03:21  Mg     2.0     03-16    TPro  4.7<L>  /  Alb  3.2<L>  /  TBili  0.4  /  DBili  x   /  AST  32  /  ALT  12  /  AlkPhos  37<L>  03-16    Creatinine Trend: 0.74<--, 0.79<--, 0.97<--, 0.98<--                        9.0    14.99 )-----------( 159      ( 16 Mar 2022 03:21 )             27.1     PT/INR - ( 15 Mar 2022 17:42 )   PT: 14.6 sec;   INR: 1.26 ratio         PTT - ( 15 Mar 2022 17:42 )  PTT:25.4 sec    Lipid Panel:   Cardiac Enzymes: CARDIAC MARKERS ( 16 Mar 2022 03:21 )  x     / 0.23 ng/mL / 813 U/L / x     / 19.1 ng/mL  CARDIAC MARKERS ( 15 Mar 2022 17:42 )  x     / 0.21 ng/mL / 320 U/L / x     / 24.1 ng/mL  CARDIAC MARKERS ( 14 Mar 2022 11:22 )  x     / <0.01 ng/mL / x     / x     / x                RADIOLOGY:    ECG; NSR, first degree AVB,     TELEMETRY:  NSR, occasional PVCs    ECHO: 3/14/22  Summary:   1. The left atrium is normal in size.   2. Segmental wall motion abnormalities in RCA territory.   3. Left ventricular ejection fraction, by visual estimation, is 50 to   55%. Grade I diastolic dysfunction.   4. The right atrium is normal in size.   5. Normal right ventricular size and function.   6. No significant valvular abnormality.   7. There is no evidence of pericardial effusion.    STRESS TEST:    CATHETERIZATION:  3/14/22   LHC via RRA with 70-80% dLM, 50% oLAD, 95% oLCx.

## 2022-03-17 LAB
ANION GAP SERPL CALC-SCNC: 8 MMOL/L — SIGNIFICANT CHANGE UP (ref 5–17)
BUN SERPL-MCNC: 18 MG/DL — SIGNIFICANT CHANGE UP (ref 8–20)
CALCIUM SERPL-MCNC: 7.8 MG/DL — LOW (ref 8.6–10.2)
CHLORIDE SERPL-SCNC: 107 MMOL/L — SIGNIFICANT CHANGE UP (ref 98–107)
CO2 SERPL-SCNC: 23 MMOL/L — SIGNIFICANT CHANGE UP (ref 22–29)
CREAT SERPL-MCNC: 0.88 MG/DL — SIGNIFICANT CHANGE UP (ref 0.5–1.3)
EGFR: 95 ML/MIN/1.73M2 — SIGNIFICANT CHANGE UP
GLUCOSE BLDC GLUCOMTR-MCNC: 137 MG/DL — HIGH (ref 70–99)
GLUCOSE BLDC GLUCOMTR-MCNC: 142 MG/DL — HIGH (ref 70–99)
GLUCOSE BLDC GLUCOMTR-MCNC: 152 MG/DL — HIGH (ref 70–99)
GLUCOSE BLDC GLUCOMTR-MCNC: 155 MG/DL — HIGH (ref 70–99)
GLUCOSE SERPL-MCNC: 150 MG/DL — HIGH (ref 70–99)
HCT VFR BLD CALC: 26.6 % — LOW (ref 39–50)
HGB BLD-MCNC: 8.8 G/DL — LOW (ref 13–17)
MAGNESIUM SERPL-MCNC: 2.3 MG/DL — SIGNIFICANT CHANGE UP (ref 1.6–2.6)
MCHC RBC-ENTMCNC: 29.8 PG — SIGNIFICANT CHANGE UP (ref 27–34)
MCHC RBC-ENTMCNC: 33.1 GM/DL — SIGNIFICANT CHANGE UP (ref 32–36)
MCV RBC AUTO: 90.2 FL — SIGNIFICANT CHANGE UP (ref 80–100)
PLATELET # BLD AUTO: 155 K/UL — SIGNIFICANT CHANGE UP (ref 150–400)
POTASSIUM SERPL-MCNC: 4.5 MMOL/L — SIGNIFICANT CHANGE UP (ref 3.5–5.3)
POTASSIUM SERPL-SCNC: 4.5 MMOL/L — SIGNIFICANT CHANGE UP (ref 3.5–5.3)
RBC # BLD: 2.95 M/UL — LOW (ref 4.2–5.8)
RBC # FLD: 14.1 % — SIGNIFICANT CHANGE UP (ref 10.3–14.5)
SODIUM SERPL-SCNC: 138 MMOL/L — SIGNIFICANT CHANGE UP (ref 135–145)
WBC # BLD: 20.4 K/UL — HIGH (ref 3.8–10.5)
WBC # FLD AUTO: 20.4 K/UL — HIGH (ref 3.8–10.5)

## 2022-03-17 PROCEDURE — 99233 SBSQ HOSP IP/OBS HIGH 50: CPT

## 2022-03-17 PROCEDURE — 99024 POSTOP FOLLOW-UP VISIT: CPT

## 2022-03-17 PROCEDURE — 71045 X-RAY EXAM CHEST 1 VIEW: CPT | Mod: 26

## 2022-03-17 PROCEDURE — 93010 ELECTROCARDIOGRAM REPORT: CPT

## 2022-03-17 PROCEDURE — 71045 X-RAY EXAM CHEST 1 VIEW: CPT | Mod: 26,77

## 2022-03-17 RX ORDER — SODIUM CHLORIDE 9 MG/ML
3 INJECTION INTRAMUSCULAR; INTRAVENOUS; SUBCUTANEOUS EVERY 8 HOURS
Refills: 0 | Status: DISCONTINUED | OUTPATIENT
Start: 2022-03-17 | End: 2022-03-20

## 2022-03-17 RX ORDER — METOCLOPRAMIDE HCL 10 MG
10 TABLET ORAL ONCE
Refills: 0 | Status: COMPLETED | OUTPATIENT
Start: 2022-03-17 | End: 2022-03-17

## 2022-03-17 RX ORDER — ACETAMINOPHEN 500 MG
975 TABLET ORAL EVERY 8 HOURS
Refills: 0 | Status: COMPLETED | OUTPATIENT
Start: 2022-03-17 | End: 2022-03-19

## 2022-03-17 RX ADMIN — Medication 975 MILLIGRAM(S): at 19:32

## 2022-03-17 RX ADMIN — OXYCODONE HYDROCHLORIDE 10 MILLIGRAM(S): 5 TABLET ORAL at 18:08

## 2022-03-17 RX ADMIN — SENNA PLUS 2 TABLET(S): 8.6 TABLET ORAL at 21:04

## 2022-03-17 RX ADMIN — OXYCODONE HYDROCHLORIDE 5 MILLIGRAM(S): 5 TABLET ORAL at 08:47

## 2022-03-17 RX ADMIN — Medication 975 MILLIGRAM(S): at 08:19

## 2022-03-17 RX ADMIN — ATORVASTATIN CALCIUM 80 MILLIGRAM(S): 80 TABLET, FILM COATED ORAL at 21:04

## 2022-03-17 RX ADMIN — Medication 100 MILLIGRAM(S): at 08:19

## 2022-03-17 RX ADMIN — CHLORHEXIDINE GLUCONATE 1 APPLICATION(S): 213 SOLUTION TOPICAL at 11:35

## 2022-03-17 RX ADMIN — AMLODIPINE BESYLATE 2.5 MILLIGRAM(S): 2.5 TABLET ORAL at 05:50

## 2022-03-17 RX ADMIN — Medication 300 MILLIGRAM(S): at 08:20

## 2022-03-17 RX ADMIN — Medication 2: at 11:34

## 2022-03-17 RX ADMIN — SODIUM CHLORIDE 3 MILLILITER(S): 9 INJECTION INTRAMUSCULAR; INTRAVENOUS; SUBCUTANEOUS at 14:31

## 2022-03-17 RX ADMIN — Medication 25 MILLIGRAM(S): at 18:08

## 2022-03-17 RX ADMIN — CHLORHEXIDINE GLUCONATE 5 MILLILITER(S): 213 SOLUTION TOPICAL at 05:51

## 2022-03-17 RX ADMIN — Medication 10 MILLIGRAM(S): at 08:47

## 2022-03-17 RX ADMIN — POLYETHYLENE GLYCOL 3350 17 GRAM(S): 17 POWDER, FOR SOLUTION ORAL at 11:33

## 2022-03-17 RX ADMIN — Medication 2: at 17:37

## 2022-03-17 RX ADMIN — Medication 100 MILLIGRAM(S): at 00:57

## 2022-03-17 RX ADMIN — Medication 25 MILLIGRAM(S): at 05:53

## 2022-03-17 RX ADMIN — OXYCODONE HYDROCHLORIDE 5 MILLIGRAM(S): 5 TABLET ORAL at 09:20

## 2022-03-17 RX ADMIN — Medication 40 MILLIGRAM(S): at 14:32

## 2022-03-17 RX ADMIN — OXYCODONE HYDROCHLORIDE 10 MILLIGRAM(S): 5 TABLET ORAL at 19:08

## 2022-03-17 RX ADMIN — Medication 81 MILLIGRAM(S): at 11:35

## 2022-03-17 RX ADMIN — PANTOPRAZOLE SODIUM 40 MILLIGRAM(S): 20 TABLET, DELAYED RELEASE ORAL at 11:34

## 2022-03-17 RX ADMIN — Medication 975 MILLIGRAM(S): at 08:45

## 2022-03-17 RX ADMIN — SODIUM CHLORIDE 3 MILLILITER(S): 9 INJECTION INTRAMUSCULAR; INTRAVENOUS; SUBCUTANEOUS at 20:27

## 2022-03-17 RX ADMIN — ENOXAPARIN SODIUM 40 MILLIGRAM(S): 100 INJECTION SUBCUTANEOUS at 14:32

## 2022-03-17 RX ADMIN — Medication 975 MILLIGRAM(S): at 18:07

## 2022-03-17 NOTE — OCCUPATIONAL THERAPY INITIAL EVALUATION ADULT - RANGE OF MOTION EXAMINATION
Bilateral UE AROM all joints WFL except for bilateral shoulder flexion only assessed to 90 degrees within sternal precautions. Right UE hand gross grasp and digit extension/abduction limited due to swelling

## 2022-03-17 NOTE — OCCUPATIONAL THERAPY INITIAL EVALUATION ADULT - WEIGHT-BEARING RESTRICTIONS: TOILET, REHAB EVAL
Patient failed inpatient criteria. Second level of review completed and supports observation. UR committee in agreement. Per Nicole Estrada discussed with Dr. Deepak Ely who approves observation status. Observation letter given to the patient and order written.
weight-bearing as tolerated

## 2022-03-17 NOTE — OCCUPATIONAL THERAPY INITIAL EVALUATION ADULT - LIVES WITH, PROFILE
Pt lives in private house with his wife who is able to assist. 1 ESTEBAN, no steps inside to negotiate. Pt states wife works from home/spouse

## 2022-03-17 NOTE — PROGRESS NOTE ADULT - SUBJECTIVE AND OBJECTIVE BOX
Patient states "I feel ok".   Patient denies CP, SOB, N/V/D, Fever or Chills    ICU Vital Signs Last 24 Hrs  T(C): 36.8 (17 Mar 2022 00:00), Max: 37.3 (16 Mar 2022 13:00)  T(F): 98.3 (17 Mar 2022 00:00), Max: 99.1 (16 Mar 2022 13:00)  HR: 93 (17 Mar 2022 01:00) (68 - 108)  BP: --  BP(mean): --  ABP: 112/55 (17 Mar 2022 01:00) (0/0 - 173/58)  ABP(mean): 73 (17 Mar 2022 01:00) (0 - 89)  RR: 17 (17 Mar 2022 01:00) (10 - 27)  SpO2: 91% (17 Mar 2022 01:00) (91% - 100%)                          8.3    18.99 )-----------( 150      ( 16 Mar 2022 12:46 )             25.1   03-16    140  |  108<H>  |  16.6  ----------------------------<  130<H>  4.1   |  21.0<L>  |  0.74    Ca    7.4<L>      16 Mar 2022 03:21  Mg     2.0     03-16    TPro  4.7<L>  /  Alb  3.2<L>  /  TBili  0.4  /  DBili  x   /  AST  32  /  ALT  12  /  AlkPhos  37<L>  03-16    I&O's Summary    15 Mar 2022 07:01  -  16 Mar 2022 07:00  --------------------------------------------------------  IN: 3750.7 mL / OUT: 2745 mL / NET: 1005.7 mL    16 Mar 2022 07:01  -  17 Mar 2022 02:26  --------------------------------------------------------  IN: 2570 mL / OUT: 1500 mL / NET: 1070 mL            PHYSICAL EXAM:  Neuro, A+Ox3, DANIELS, FC  CHEST/LUNG: Clear to auscultation bilaterally with decreased breath sounds at bases B/L; No wheezing.  HEART: S1S2+, Regular rate and rhythm; No murmurs.  ABDOMEN: Soft, Nontender, Nondistended  SKIN: MSI CDI, Right EVH site CDI, Right UE with Ace bandage due to radial artery harvest, Dressing CDI, CTs in place with no Airleak

## 2022-03-17 NOTE — DIETITIAN INITIAL EVALUATION ADULT. - OTHER INFO
66 year old male with PMH of HTN, HLD, family history of CAD with intermittent persistent chest pain and abnormal NST and reduced EF 45-50% now s/p CABG x 3 on 3/15. right leg surgical incision, right arm surgical incision, sternal surgical incision noted. Last documented BM 3/14.

## 2022-03-17 NOTE — OCCUPATIONAL THERAPY INITIAL EVALUATION ADULT - EDEMA, REHAB EVAL
Bilateral UE swelling, Right worse than left; right dorsal aspect of hand. Right Forearm with ace wrap

## 2022-03-17 NOTE — DIETITIAN INITIAL EVALUATION ADULT. - PERTINENT MEDS FT
MEDICATIONS  (STANDING):  amLODIPine   Tablet 2.5 milliGRAM(s) Oral daily  aspirin  chewable 81 milliGRAM(s) Oral daily  atorvastatin 80 milliGRAM(s) Oral at bedtime  chlorhexidine 0.12% Liquid 5 milliLiter(s) Oral Mucosa two times a day  chlorhexidine 2% Cloths 1 Application(s) Topical daily  enoxaparin Injectable 40 milliGRAM(s) SubCutaneous every 24 hours  insulin lispro (ADMELOG) corrective regimen sliding scale   SubCutaneous Before meals and at bedtime  metoprolol tartrate 25 milliGRAM(s) Oral two times a day  pantoprazole    Tablet 40 milliGRAM(s) Oral daily  polyethylene glycol 3350 17 Gram(s) Oral daily  senna 2 Tablet(s) Oral at bedtime    MEDICATIONS  (PRN):  oxyCODONE    IR 5 milliGRAM(s) Oral every 4 hours PRN Moderate Pain (4 - 6)  oxyCODONE    IR 10 milliGRAM(s) Oral every 4 hours PRN Severe Pain (7 - 10)

## 2022-03-17 NOTE — DIETITIAN INITIAL EVALUATION ADULT. - ORAL INTAKE PTA/DIET HISTORY
Pt reports poor PO intake since admission. Observed >50% of breakfast consumed. States UBW is 200# and reports 12# wt loss over the past year. Educated pt on heart healthy diet and HBV protein. Provided heart healthy written materials. Pt was receptive and had no further questions at this time. RD to remain available. Pt reports poor PO intake since admission. Observed <50% of breakfast consumed. States UBW is 200# and reports 12# wt loss over the past year. Educated pt on heart healthy diet and HBV protein. Provided heart healthy written materials. Pt was receptive and had no further questions at this time. RD to remain available.

## 2022-03-17 NOTE — OCCUPATIONAL THERAPY INITIAL EVALUATION ADULT - NS ASR OT EQUIP NEEDS DISCH
DME recommendations pending pt progress with therapy/bathing/toileting/rolling walker (5 inch wheels)

## 2022-03-17 NOTE — OCCUPATIONAL THERAPY INITIAL EVALUATION ADULT - GENERAL OBSERVATIONS, REHAB EVAL
Received pt ambulating in [place] with PT Charanjit, NAD, +Chest tube, +IV lock, +Tele/, +Pacer box. Pre/post pain level 7/10 right arm/chest incision site; RN aware. Patient agreeable to OT evaluation

## 2022-03-17 NOTE — PROGRESS NOTE ADULT - SUBJECTIVE AND OBJECTIVE BOX
TREVOR JAIN  [id number]      Chief Complaint:  S/P CABG (LIMA-LAD, Radial-Ramus, SVG-OM2)    Interval History:  No events overnight    Tele:        amLODIPine   Tablet 2.5 milliGRAM(s) Oral daily  aspirin  chewable 81 milliGRAM(s) Oral daily  atorvastatin 80 milliGRAM(s) Oral at bedtime  enoxaparin Injectable 40 milliGRAM(s) SubCutaneous every 24 hours  insulin lispro (ADMELOG) corrective regimen sliding scale   SubCutaneous Before meals and at bedtime  metoprolol tartrate 25 milliGRAM(s) Oral two times a day  oxyCODONE    IR 5 milliGRAM(s) Oral every 4 hours PRN  oxyCODONE    IR 10 milliGRAM(s) Oral every 4 hours PRN  pantoprazole    Tablet 40 milliGRAM(s) Oral daily  polyethylene glycol 3350 17 Gram(s) Oral daily  senna 2 Tablet(s) Oral at bedtime  sodium chloride 0.9% lock flush 3 milliLiter(s) IV Push every 8 hours  torsemide 40 milliGRAM(s) Oral daily          Physical Exam:  T(C): 37.1 (03-17-22 @ 13:00), Max: 37.9 (03-17-22 @ 08:00)  HR: 80 (03-17-22 @ 11:39) (75 - 108)  BP: 106/58 (03-17-22 @ 11:39) (106/58 - 117/58)  RR: 20 (03-17-22 @ 11:39) (13 - 27)  SpO2: 99% (03-17-22 @ 11:39) (91% - 100%)  Wt(kg): --  General: Comfortable in NAD  Neck: No JVD  CVS: nl s1s2, no s3  Pulm: CTA b/l  Abd: soft, non-tender  Ext: No c/c/e  Neuro A&O x3  Psych: Normal affect      Labs:   17 Mar 2022 03:24    138    |  107    |  18.0   ----------------------------<  150    4.5     |  23.0   |  0.88     Ca    7.8        17 Mar 2022 03:24  Mg     2.3       17 Mar 2022 03:24    TPro  4.7    /  Alb  3.2    /  TBili  0.4    /  DBili  x      /  AST  32     /  ALT  12     /  AlkPhos  37     16 Mar 2022 03:21                          8.8    20.40 )-----------( 155      ( 17 Mar 2022 03:24 )             26.6     PT/INR - ( 15 Mar 2022 17:42 )   PT: 14.6 sec;   INR: 1.26 ratio         PTT - ( 15 Mar 2022 17:42 )  PTT:25.4 sec  CARDIAC MARKERS ( 16 Mar 2022 03:21 )  x     / 0.23 ng/mL / 813 U/L / x     / 19.1 ng/mL  CARDIAC MARKERS ( 15 Mar 2022 17:42 )  x     / 0.21 ng/mL / 320 U/L / x     / 24.1 ng/mL          Assessment and Recommendation:   · Assessment	  66M with hx of HTN. HLD, vamshi reduced LVEF, who came electively for a C was found to have severe 3VD CAD, was surgically revascularized with CABG  (LIMA-LAD, Radial-Ramus, SVG-OM2), Cardiology service called for co-management in the postoperative stay. Patient was seen in a chair, in pain. He denies shortness of breath, no palpitations.         Problem/Recommendation - 1:  ·  Problem: CAD (coronary artery disease).   ·  Recommendation: continue with aspirin, statin   once BP allows it suggest small dose of metoprolol and ACEi  will continue to follow.     Problem/Recommendation - 2:  ·  Problem: S/P CABG x 3.   ·  Recommendation: post- surgical care by CTICU team   wean off pressors as tolerated, may need some IVF, dry on exam.     TREVOR JAIN  [id number]      Chief Complaint:  S/P CABG (LIMA-LAD, Radial-Ramus, SVG-OM2)    Interval History:  No events overnight    Tele:        amLODIPine   Tablet 2.5 milliGRAM(s) Oral daily  aspirin  chewable 81 milliGRAM(s) Oral daily  atorvastatin 80 milliGRAM(s) Oral at bedtime  enoxaparin Injectable 40 milliGRAM(s) SubCutaneous every 24 hours  insulin lispro (ADMELOG) corrective regimen sliding scale   SubCutaneous Before meals and at bedtime  metoprolol tartrate 25 milliGRAM(s) Oral two times a day  oxyCODONE    IR 5 milliGRAM(s) Oral every 4 hours PRN  oxyCODONE    IR 10 milliGRAM(s) Oral every 4 hours PRN  pantoprazole    Tablet 40 milliGRAM(s) Oral daily  polyethylene glycol 3350 17 Gram(s) Oral daily  senna 2 Tablet(s) Oral at bedtime  sodium chloride 0.9% lock flush 3 milliLiter(s) IV Push every 8 hours  torsemide 40 milliGRAM(s) Oral daily          Physical Exam:  T(C): 37.1 (03-17-22 @ 13:00), Max: 37.9 (03-17-22 @ 08:00)  HR: 80 (03-17-22 @ 11:39) (75 - 108)  BP: 106/58 (03-17-22 @ 11:39) (106/58 - 117/58)  RR: 20 (03-17-22 @ 11:39) (13 - 27)  SpO2: 99% (03-17-22 @ 11:39) (91% - 100%)  Wt(kg): --  General: Comfortable in NAD  Neck: No JVD  CVS: nl s1s2, no s3  Pulm: CTA b/l  Abd: soft, non-tender  Ext: No c/c/e  Neuro A&O x3  Psych: Normal affect      Labs:   17 Mar 2022 03:24    138    |  107    |  18.0   ----------------------------<  150    4.5     |  23.0   |  0.88     Ca    7.8        17 Mar 2022 03:24  Mg     2.3       17 Mar 2022 03:24    TPro  4.7    /  Alb  3.2    /  TBili  0.4    /  DBili  x      /  AST  32     /  ALT  12     /  AlkPhos  37     16 Mar 2022 03:21                          8.8    20.40 )-----------( 155      ( 17 Mar 2022 03:24 )             26.6     PT/INR - ( 15 Mar 2022 17:42 )   PT: 14.6 sec;   INR: 1.26 ratio         PTT - ( 15 Mar 2022 17:42 )  PTT:25.4 sec  CARDIAC MARKERS ( 16 Mar 2022 03:21 )  x     / 0.23 ng/mL / 813 U/L / x     / 19.1 ng/mL  CARDIAC MARKERS ( 15 Mar 2022 17:42 )  x     / 0.21 ng/mL / 320 U/L / x     / 24.1 ng/mL          Assessment and Recommendation:   · Assessment	  66M with hx of HTN. HLD, vamshi reduced LVEF, who came electively for a C was found to have severe 3VD CAD, was surgically revascularized with CABG  (LIMA-LAD, Radial-Ramus, SVG-OM2), Cardiology service called for co-management in the postoperative stay. Patient was seen in a chair, in pain. He denies shortness of breath, no palpitations.         Problem/Recommendation - 1:  ·  Problem: CAD (coronary artery disease).   ·  Recommendation: continue with aspirin, statin   BP at goal, continue with BB and CCB     Problem/Recommendation - 2:  ·  Problem: S/P CABG x 3.   ·  Recommendation: post- surgical care by CTICU team

## 2022-03-17 NOTE — DIETITIAN INITIAL EVALUATION ADULT. - ADD RECOMMEND
Add Ensure Enlive TID to optimize po intake and provide an additional 350kcal, 20g protein per serving, Rx vitamin C (500mg) daily

## 2022-03-17 NOTE — OCCUPATIONAL THERAPY INITIAL EVALUATION ADULT - PLANNED THERAPY INTERVENTIONS, OT EVAL
ADL retraining/IADL retraining/balance training/motor coordination training/neuromuscular re-education/ROM/strengthening/transfer training

## 2022-03-17 NOTE — DIETITIAN INITIAL EVALUATION ADULT. - PERTINENT LABORATORY DATA
03-17 Na138 mmol/L Glu 150 mg/dL<H> K+ 4.5 mmol/L Cr  0.88 mg/dL BUN 18.0 mg/dL Phos n/a   Alb n/a   PAB n/a

## 2022-03-17 NOTE — OCCUPATIONAL THERAPY INITIAL EVALUATION ADULT - ADDITIONAL COMMENTS
Pt has a tub with curtains and no grab bars. Pt owns no DME. Pt is ambidextrous but uses Right hand more. Pt drives. Pt independent for IADLs

## 2022-03-17 NOTE — DIETITIAN INITIAL EVALUATION ADULT. - ETIOLOGY
related to inability to meet sufficient protein-energy in setting of s/p CABGx3, lack of appetite related to decreased appetite post surgery (CABG X 3)

## 2022-03-18 DIAGNOSIS — Z29.9 ENCOUNTER FOR PROPHYLACTIC MEASURES, UNSPECIFIED: ICD-10-CM

## 2022-03-18 DIAGNOSIS — D62 ACUTE POSTHEMORRHAGIC ANEMIA: ICD-10-CM

## 2022-03-18 DIAGNOSIS — I10 ESSENTIAL (PRIMARY) HYPERTENSION: ICD-10-CM

## 2022-03-18 LAB
ANION GAP SERPL CALC-SCNC: 11 MMOL/L — SIGNIFICANT CHANGE UP (ref 5–17)
BUN SERPL-MCNC: 23.4 MG/DL — HIGH (ref 8–20)
CALCIUM SERPL-MCNC: 7.8 MG/DL — LOW (ref 8.6–10.2)
CHLORIDE SERPL-SCNC: 104 MMOL/L — SIGNIFICANT CHANGE UP (ref 98–107)
CO2 SERPL-SCNC: 27 MMOL/L — SIGNIFICANT CHANGE UP (ref 22–29)
CREAT SERPL-MCNC: 1.01 MG/DL — SIGNIFICANT CHANGE UP (ref 0.5–1.3)
EGFR: 82 ML/MIN/1.73M2 — SIGNIFICANT CHANGE UP
GLUCOSE BLDC GLUCOMTR-MCNC: 121 MG/DL — HIGH (ref 70–99)
GLUCOSE BLDC GLUCOMTR-MCNC: 121 MG/DL — HIGH (ref 70–99)
GLUCOSE BLDC GLUCOMTR-MCNC: 129 MG/DL — HIGH (ref 70–99)
GLUCOSE BLDC GLUCOMTR-MCNC: 147 MG/DL — HIGH (ref 70–99)
GLUCOSE SERPL-MCNC: 117 MG/DL — HIGH (ref 70–99)
HCT VFR BLD CALC: 26.9 % — LOW (ref 39–50)
HGB BLD-MCNC: 8.7 G/DL — LOW (ref 13–17)
MAGNESIUM SERPL-MCNC: 2.3 MG/DL — SIGNIFICANT CHANGE UP (ref 1.8–2.6)
MCHC RBC-ENTMCNC: 29.6 PG — SIGNIFICANT CHANGE UP (ref 27–34)
MCHC RBC-ENTMCNC: 32.3 GM/DL — SIGNIFICANT CHANGE UP (ref 32–36)
MCV RBC AUTO: 91.5 FL — SIGNIFICANT CHANGE UP (ref 80–100)
PLATELET # BLD AUTO: 146 K/UL — LOW (ref 150–400)
POTASSIUM SERPL-MCNC: 3.5 MMOL/L — SIGNIFICANT CHANGE UP (ref 3.5–5.3)
POTASSIUM SERPL-SCNC: 3.5 MMOL/L — SIGNIFICANT CHANGE UP (ref 3.5–5.3)
RBC # BLD: 2.94 M/UL — LOW (ref 4.2–5.8)
RBC # FLD: 13.9 % — SIGNIFICANT CHANGE UP (ref 10.3–14.5)
SODIUM SERPL-SCNC: 141 MMOL/L — SIGNIFICANT CHANGE UP (ref 135–145)
WBC # BLD: 15.46 K/UL — HIGH (ref 3.8–10.5)
WBC # FLD AUTO: 15.46 K/UL — HIGH (ref 3.8–10.5)

## 2022-03-18 PROCEDURE — 99233 SBSQ HOSP IP/OBS HIGH 50: CPT

## 2022-03-18 PROCEDURE — 71045 X-RAY EXAM CHEST 1 VIEW: CPT | Mod: 26

## 2022-03-18 PROCEDURE — 93010 ELECTROCARDIOGRAM REPORT: CPT

## 2022-03-18 RX ORDER — ONDANSETRON 8 MG/1
4 TABLET, FILM COATED ORAL ONCE
Refills: 0 | Status: COMPLETED | OUTPATIENT
Start: 2022-03-18 | End: 2022-03-18

## 2022-03-18 RX ORDER — POTASSIUM CHLORIDE 20 MEQ
40 PACKET (EA) ORAL
Refills: 0 | Status: COMPLETED | OUTPATIENT
Start: 2022-03-18 | End: 2022-03-18

## 2022-03-18 RX ORDER — FOLIC ACID 0.8 MG
1 TABLET ORAL DAILY
Refills: 0 | Status: DISCONTINUED | OUTPATIENT
Start: 2022-03-18 | End: 2022-03-20

## 2022-03-18 RX ORDER — FERROUS SULFATE 325(65) MG
325 TABLET ORAL THREE TIMES A DAY
Refills: 0 | Status: DISCONTINUED | OUTPATIENT
Start: 2022-03-18 | End: 2022-03-20

## 2022-03-18 RX ORDER — ASCORBIC ACID 60 MG
500 TABLET,CHEWABLE ORAL DAILY
Refills: 0 | Status: DISCONTINUED | OUTPATIENT
Start: 2022-03-18 | End: 2022-03-20

## 2022-03-18 RX ADMIN — SODIUM CHLORIDE 3 MILLILITER(S): 9 INJECTION INTRAMUSCULAR; INTRAVENOUS; SUBCUTANEOUS at 21:03

## 2022-03-18 RX ADMIN — Medication 25 MILLIGRAM(S): at 18:51

## 2022-03-18 RX ADMIN — PANTOPRAZOLE SODIUM 40 MILLIGRAM(S): 20 TABLET, DELAYED RELEASE ORAL at 13:42

## 2022-03-18 RX ADMIN — AMLODIPINE BESYLATE 2.5 MILLIGRAM(S): 2.5 TABLET ORAL at 05:29

## 2022-03-18 RX ADMIN — Medication 325 MILLIGRAM(S): at 13:41

## 2022-03-18 RX ADMIN — Medication 81 MILLIGRAM(S): at 13:43

## 2022-03-18 RX ADMIN — OXYCODONE HYDROCHLORIDE 10 MILLIGRAM(S): 5 TABLET ORAL at 03:30

## 2022-03-18 RX ADMIN — SODIUM CHLORIDE 3 MILLILITER(S): 9 INJECTION INTRAMUSCULAR; INTRAVENOUS; SUBCUTANEOUS at 13:34

## 2022-03-18 RX ADMIN — Medication 1 TABLET(S): at 13:42

## 2022-03-18 RX ADMIN — Medication 975 MILLIGRAM(S): at 13:43

## 2022-03-18 RX ADMIN — ONDANSETRON 4 MILLIGRAM(S): 8 TABLET, FILM COATED ORAL at 10:04

## 2022-03-18 RX ADMIN — Medication 40 MILLIEQUIVALENT(S): at 13:42

## 2022-03-18 RX ADMIN — Medication 500 MILLIGRAM(S): at 13:41

## 2022-03-18 RX ADMIN — Medication 40 MILLIEQUIVALENT(S): at 18:51

## 2022-03-18 RX ADMIN — SODIUM CHLORIDE 3 MILLILITER(S): 9 INJECTION INTRAMUSCULAR; INTRAVENOUS; SUBCUTANEOUS at 05:27

## 2022-03-18 RX ADMIN — Medication 40 MILLIGRAM(S): at 05:29

## 2022-03-18 RX ADMIN — Medication 325 MILLIGRAM(S): at 05:29

## 2022-03-18 RX ADMIN — SENNA PLUS 2 TABLET(S): 8.6 TABLET ORAL at 21:18

## 2022-03-18 RX ADMIN — POLYETHYLENE GLYCOL 3350 17 GRAM(S): 17 POWDER, FOR SOLUTION ORAL at 13:42

## 2022-03-18 RX ADMIN — Medication 25 MILLIGRAM(S): at 05:29

## 2022-03-18 RX ADMIN — OXYCODONE HYDROCHLORIDE 10 MILLIGRAM(S): 5 TABLET ORAL at 02:49

## 2022-03-18 RX ADMIN — Medication 325 MILLIGRAM(S): at 21:18

## 2022-03-18 RX ADMIN — Medication 1 MILLIGRAM(S): at 13:42

## 2022-03-18 RX ADMIN — ATORVASTATIN CALCIUM 80 MILLIGRAM(S): 80 TABLET, FILM COATED ORAL at 21:18

## 2022-03-18 NOTE — PROGRESS NOTE ADULT - SUBJECTIVE AND OBJECTIVE BOX
TREVOR JAIN  [id number]      Chief Complaint:  CAD      Interval History:  S/P CABG  feeling nauseated    Tele:  no events      acetaminophen     Tablet .. 975 milliGRAM(s) Oral every 8 hours  amLODIPine   Tablet 2.5 milliGRAM(s) Oral daily  ascorbic acid 500 milliGRAM(s) Oral daily  aspirin  chewable 81 milliGRAM(s) Oral daily  atorvastatin 80 milliGRAM(s) Oral at bedtime  enoxaparin Injectable 40 milliGRAM(s) SubCutaneous every 24 hours  ferrous    sulfate 325 milliGRAM(s) Oral three times a day  folic acid 1 milliGRAM(s) Oral daily  insulin lispro (ADMELOG) corrective regimen sliding scale   SubCutaneous Before meals and at bedtime  metoprolol tartrate 25 milliGRAM(s) Oral two times a day  multivitamin 1 Tablet(s) Oral daily  oxyCODONE    IR 5 milliGRAM(s) Oral every 4 hours PRN  oxyCODONE    IR 10 milliGRAM(s) Oral every 4 hours PRN  pantoprazole    Tablet 40 milliGRAM(s) Oral daily  polyethylene glycol 3350 17 Gram(s) Oral daily  potassium chloride   Powder 40 milliEquivalent(s) Oral every 2 hours  senna 2 Tablet(s) Oral at bedtime  sodium chloride 0.9% lock flush 3 milliLiter(s) IV Push every 8 hours  torsemide 40 milliGRAM(s) Oral daily          Physical Exam:  T(C): 37 (03-18-22 @ 05:00), Max: 38.5 (03-17-22 @ 16:09)  HR: 86 (03-18-22 @ 05:00) (80 - 91)  BP: 115/72 (03-18-22 @ 05:00) (104/68 - 128/79)  RR: 18 (03-18-22 @ 05:00) (18 - 20)  SpO2: 92% (03-18-22 @ 05:00) (91% - 99%)  Wt(kg): --  General: Comfortable in NAD  Neck: No JVD  CVS: nl s1s2, no s3  Pulm: CTA b/l  Abd: soft, non-tender  Ext: No c/c/e  Neuro A&O x3  Psych: Normal affect      Labs:   18 Mar 2022 06:28    141    |  104    |  23.4   ----------------------------<  117    3.5     |  27.0   |  1.01     Ca    7.8        18 Mar 2022 06:28  Mg     2.3       18 Mar 2022 06:28                            8.7    15.46 )-----------( 146      ( 18 Mar 2022 06:28 )             26.9               Assessment:  66M with hx of HTN. HLD, vamshi reduced LVEF, who came electively for a [facility] was found to have severe 3VD CAD, was surgically revascularized with CABG  (LIMA-LAD, Radial-Ramus, SVG-OM2), Cardiology service called for co-management in the postoperative stay. Patient was seen in a chair, in pain. He denies shortness of breath, no palpitations.         Problem/Recommendation - 1:  ·  Problem: CAD (coronary artery disease).   ·  Recommendation: continue with aspirin, statin   BP at goal, continue with BB, CCB and loop diuretic  with nausea in the am, consider zofran PRN

## 2022-03-18 NOTE — PROGRESS NOTE ADULT - SUBJECTIVE AND OBJECTIVE BOX
POD 3 s/p C3L (LIMA-LAD, R radial-Ramus, SVG-OM2), HELLEN clip    Subjective: no complaints denies CP, palpitations, SOB, cough, fever, chills, itchiness/rash, diaphoresis, vision changes, HA, dizziness/lightheadedness, numbness/tingling, abd pain, N/V     T(C): 36.9 (03-18-22 @ 00:00), Max: 38.5 (03-17-22 @ 16:09)  HR: 82 (03-18-22 @ 02:00) (75 - 91)  BP: 126/86 (03-18-22 @ 02:00) (104/68 - 128/79)  ABP: 114/51 (03-17-22 @ 05:00) (114/51 - 114/51)  ABP(mean): 71 (03-17-22 @ 05:00) (71 - 71)  RR: 18 (03-18-22 @ 00:00) (15 - 20)  SpO2: 95% (03-18-22 @ 00:00) (91% - 99%)    03-17    138  |  107  |  18.0  ----------------------------<  150<H>  4.5   |  23.0  |  0.88    Ca    7.8<L>      17 Mar 2022 03:24  Mg     2.3     03-17                                 8.8    20.40 )-----------( 155      ( 17 Mar 2022 03:24 )             26.6          CAPILLARY BLOOD GLUCOSE  POCT Blood Glucose.: 142 mg/dL (17 Mar 2022 21:07)  POCT Blood Glucose.: 155 mg/dL (17 Mar 2022 17:19)  POCT Blood Glucose.: 152 mg/dL (17 Mar 2022 11:32)  POCT Blood Glucose.: 137 mg/dL (17 Mar 2022 08:25)    I&O's Detail    16 Mar 2022 07:01  -  17 Mar 2022 07:00  --------------------------------------------------------  IN:    Albumin 5%  - 250 mL: 250 mL    Diltiazem: 12 mL    IV PiggyBack: 50 mL    IV PiggyBack: 1000 mL    IV PiggyBack: 100 mL    Norepinephrine: 14 mL    Oral Fluid: 680 mL    PRBCs (Packed Red Blood Cells): 284 mL    sodium chloride 0.9%: 240 mL  Total IN: 2630 mL    OUT:    Chest Tube (mL): 10 mL    Chest Tube (mL): 420 mL    Chest Tube (mL): 100 mL    Chest Tube (mL): 230 mL    Indwelling Catheter - Urethral (mL): 1120 mL    sodium chloride 0.9%: 0 mL  Total OUT: 1880 mL  Total NET: 750 mL    17 Mar 2022 07:01  -  18 Mar 2022 04:34  --------------------------------------------------------  IN:    IV PiggyBack: 50 mL    IV PiggyBack: 500 mL    Oral Fluid: 900 mL  Total IN: 1450 mL    OUT:    Chest Tube (mL): 50 mL    Chest Tube (mL): 262 mL    Chest Tube (mL): 61 mL    Indwelling Catheter - Urethral (mL): 530 mL    Voided (mL): 400 mL  Total OUT: 1303 mL  Total NET: 147 mL    Drug Dosing Weight  Height (cm): 175.3 (16 Mar 2022 10:11)  Weight (kg): 90.7 (16 Mar 2022 10:11)  BMI (kg/m2): 29.5 (16 Mar 2022 10:11)  BSA (m2): 2.07 (16 Mar 2022 10:11)    MEDICATIONS  (STANDING):  acetaminophen     Tablet .. 975 milliGRAM(s) Oral every 8 hours  amLODIPine   Tablet 2.5 milliGRAM(s) Oral daily  aspirin  chewable 81 milliGRAM(s) Oral daily  atorvastatin 80 milliGRAM(s) Oral at bedtime  enoxaparin Injectable 40 milliGRAM(s) SubCutaneous every 24 hours  insulin lispro (ADMELOG) corrective regimen sliding scale   SubCutaneous Before meals and at bedtime  metoprolol tartrate 25 milliGRAM(s) Oral two times a day  pantoprazole    Tablet 40 milliGRAM(s) Oral daily  polyethylene glycol 3350 17 Gram(s) Oral daily  senna 2 Tablet(s) Oral at bedtime  sodium chloride 0.9% lock flush 3 milliLiter(s) IV Push every 8 hours  torsemide 40 milliGRAM(s) Oral daily    MEDICATIONS  (PRN):  oxyCODONE    IR 5 milliGRAM(s) Oral every 4 hours PRN Moderate Pain (4 - 6)  oxyCODONE    IR 10 milliGRAM(s) Oral every 4 hours PRN Severe Pain (7 - 10)    Physical Exam  Gen: NAD  Neuro: A&Ox3 non focal speech clear and intact  Pulm: decreased BS b/l no wheezing,    CV: S1S2 RRR no murmurs  Abd: +BS soft NT ND  Extrem/MS: no LE edema, RUE hand edema, sensation intact, good capillary refill  Incision(s): mid sternal dsg C/D/I no click sternum stable, R radial dsg intact  pericardial and L pCT to waterseal no air leak, A wires isolated EPM VVI 50/10/0.8     Hydroquinone Counseling:  Patient advised that medication may result in skin irritation, lightening (hypopigmentation), dryness, and burning.  In the event of skin irritation, the patient was advised to reduce the amount of the drug applied or use it less frequently.  Rarely, spots that are treated with hydroquinone can become darker (pseudoochronosis).  Should this occur, patient instructed to stop medication and call the office. The patient verbalized understanding of the proper use and possible adverse effects of hydroquinone.  All of the patient's questions and concerns were addressed.

## 2022-03-19 LAB
ANION GAP SERPL CALC-SCNC: 13 MMOL/L — SIGNIFICANT CHANGE UP (ref 5–17)
BUN SERPL-MCNC: 26.1 MG/DL — HIGH (ref 8–20)
CALCIUM SERPL-MCNC: 8 MG/DL — LOW (ref 8.6–10.2)
CHLORIDE SERPL-SCNC: 102 MMOL/L — SIGNIFICANT CHANGE UP (ref 98–107)
CO2 SERPL-SCNC: 27 MMOL/L — SIGNIFICANT CHANGE UP (ref 22–29)
CREAT SERPL-MCNC: 0.95 MG/DL — SIGNIFICANT CHANGE UP (ref 0.5–1.3)
EGFR: 88 ML/MIN/1.73M2 — SIGNIFICANT CHANGE UP
GLUCOSE SERPL-MCNC: 103 MG/DL — HIGH (ref 70–99)
HCT VFR BLD CALC: 27.4 % — LOW (ref 39–50)
HCT VFR BLD CALC: 27.8 % — LOW (ref 39–50)
HGB BLD-MCNC: 8.9 G/DL — LOW (ref 13–17)
HGB BLD-MCNC: 9 G/DL — LOW (ref 13–17)
MAGNESIUM SERPL-MCNC: 2.3 MG/DL — SIGNIFICANT CHANGE UP (ref 1.8–2.6)
MCHC RBC-ENTMCNC: 28.7 PG — SIGNIFICANT CHANGE UP (ref 27–34)
MCHC RBC-ENTMCNC: 29.9 PG — SIGNIFICANT CHANGE UP (ref 27–34)
MCHC RBC-ENTMCNC: 32 GM/DL — SIGNIFICANT CHANGE UP (ref 32–36)
MCHC RBC-ENTMCNC: 32.8 GM/DL — SIGNIFICANT CHANGE UP (ref 32–36)
MCV RBC AUTO: 89.7 FL — SIGNIFICANT CHANGE UP (ref 80–100)
MCV RBC AUTO: 91 FL — SIGNIFICANT CHANGE UP (ref 80–100)
PLATELET # BLD AUTO: 204 K/UL — SIGNIFICANT CHANGE UP (ref 150–400)
PLATELET # BLD AUTO: 235 K/UL — SIGNIFICANT CHANGE UP (ref 150–400)
POTASSIUM SERPL-MCNC: 3.6 MMOL/L — SIGNIFICANT CHANGE UP (ref 3.5–5.3)
POTASSIUM SERPL-SCNC: 3.6 MMOL/L — SIGNIFICANT CHANGE UP (ref 3.5–5.3)
RBC # BLD: 3.01 M/UL — LOW (ref 4.2–5.8)
RBC # BLD: 3.1 M/UL — LOW (ref 4.2–5.8)
RBC # FLD: 13.6 % — SIGNIFICANT CHANGE UP (ref 10.3–14.5)
RBC # FLD: 13.6 % — SIGNIFICANT CHANGE UP (ref 10.3–14.5)
SODIUM SERPL-SCNC: 142 MMOL/L — SIGNIFICANT CHANGE UP (ref 135–145)
WBC # BLD: 13.26 K/UL — HIGH (ref 3.8–10.5)
WBC # BLD: 13.63 K/UL — HIGH (ref 3.8–10.5)
WBC # FLD AUTO: 13.26 K/UL — HIGH (ref 3.8–10.5)
WBC # FLD AUTO: 13.63 K/UL — HIGH (ref 3.8–10.5)

## 2022-03-19 PROCEDURE — 71045 X-RAY EXAM CHEST 1 VIEW: CPT | Mod: 26

## 2022-03-19 PROCEDURE — 99232 SBSQ HOSP IP/OBS MODERATE 35: CPT

## 2022-03-19 RX ORDER — POTASSIUM CHLORIDE 20 MEQ
40 PACKET (EA) ORAL ONCE
Refills: 0 | Status: DISCONTINUED | OUTPATIENT
Start: 2022-03-19 | End: 2022-03-19

## 2022-03-19 RX ORDER — LACTULOSE 10 G/15ML
20 SOLUTION ORAL EVERY 6 HOURS
Refills: 0 | Status: DISCONTINUED | OUTPATIENT
Start: 2022-03-19 | End: 2022-03-19

## 2022-03-19 RX ORDER — ONDANSETRON 8 MG/1
4 TABLET, FILM COATED ORAL ONCE
Refills: 0 | Status: COMPLETED | OUTPATIENT
Start: 2022-03-19 | End: 2022-03-19

## 2022-03-19 RX ORDER — SORBITOL SOLUTION 70 %
30 SOLUTION, ORAL MISCELLANEOUS ONCE
Refills: 0 | Status: COMPLETED | OUTPATIENT
Start: 2022-03-19 | End: 2022-03-19

## 2022-03-19 RX ORDER — ONDANSETRON 8 MG/1
4 TABLET, FILM COATED ORAL EVERY 8 HOURS
Refills: 0 | Status: DISCONTINUED | OUTPATIENT
Start: 2022-03-19 | End: 2022-03-20

## 2022-03-19 RX ORDER — POTASSIUM CHLORIDE 20 MEQ
40 PACKET (EA) ORAL ONCE
Refills: 0 | Status: COMPLETED | OUTPATIENT
Start: 2022-03-19 | End: 2022-03-19

## 2022-03-19 RX ADMIN — Medication 81 MILLIGRAM(S): at 12:50

## 2022-03-19 RX ADMIN — Medication 325 MILLIGRAM(S): at 05:11

## 2022-03-19 RX ADMIN — Medication 975 MILLIGRAM(S): at 12:49

## 2022-03-19 RX ADMIN — Medication 40 MILLIEQUIVALENT(S): at 12:49

## 2022-03-19 RX ADMIN — PANTOPRAZOLE SODIUM 40 MILLIGRAM(S): 20 TABLET, DELAYED RELEASE ORAL at 12:49

## 2022-03-19 RX ADMIN — Medication 1 MILLIGRAM(S): at 12:50

## 2022-03-19 RX ADMIN — ONDANSETRON 4 MILLIGRAM(S): 8 TABLET, FILM COATED ORAL at 10:21

## 2022-03-19 RX ADMIN — Medication 25 MILLIGRAM(S): at 05:13

## 2022-03-19 RX ADMIN — SODIUM CHLORIDE 3 MILLILITER(S): 9 INJECTION INTRAMUSCULAR; INTRAVENOUS; SUBCUTANEOUS at 21:41

## 2022-03-19 RX ADMIN — SODIUM CHLORIDE 3 MILLILITER(S): 9 INJECTION INTRAMUSCULAR; INTRAVENOUS; SUBCUTANEOUS at 03:59

## 2022-03-19 RX ADMIN — AMLODIPINE BESYLATE 2.5 MILLIGRAM(S): 2.5 TABLET ORAL at 05:11

## 2022-03-19 RX ADMIN — Medication 325 MILLIGRAM(S): at 12:45

## 2022-03-19 RX ADMIN — POLYETHYLENE GLYCOL 3350 17 GRAM(S): 17 POWDER, FOR SOLUTION ORAL at 12:45

## 2022-03-19 RX ADMIN — Medication 1 TABLET(S): at 12:50

## 2022-03-19 RX ADMIN — ATORVASTATIN CALCIUM 80 MILLIGRAM(S): 80 TABLET, FILM COATED ORAL at 21:47

## 2022-03-19 RX ADMIN — Medication 40 MILLIGRAM(S): at 05:12

## 2022-03-19 RX ADMIN — Medication 500 MILLIGRAM(S): at 12:50

## 2022-03-19 RX ADMIN — Medication 30 MILLILITER(S): at 14:41

## 2022-03-19 RX ADMIN — SODIUM CHLORIDE 3 MILLILITER(S): 9 INJECTION INTRAMUSCULAR; INTRAVENOUS; SUBCUTANEOUS at 13:04

## 2022-03-19 RX ADMIN — Medication 25 MILLIGRAM(S): at 18:58

## 2022-03-19 RX ADMIN — Medication 325 MILLIGRAM(S): at 21:49

## 2022-03-19 RX ADMIN — ENOXAPARIN SODIUM 40 MILLIGRAM(S): 100 INJECTION SUBCUTANEOUS at 03:07

## 2022-03-19 RX ADMIN — ENOXAPARIN SODIUM 40 MILLIGRAM(S): 100 INJECTION SUBCUTANEOUS at 23:25

## 2022-03-19 NOTE — PROGRESS NOTE ADULT - SUBJECTIVE AND OBJECTIVE BOX
TREVOR CARDONAEFRAIN  [id number]      Chief Complaint:  CAD s/p CABG    Interval History:  Patient c/o severe nausea.  No other c/o.  Denies CP, SOB, palps.    Tele:  No events      acetaminophen     Tablet .. 975 milliGRAM(s) Oral every 8 hours  amLODIPine   Tablet 2.5 milliGRAM(s) Oral daily  ascorbic acid 500 milliGRAM(s) Oral daily  aspirin  chewable 81 milliGRAM(s) Oral daily  atorvastatin 80 milliGRAM(s) Oral at bedtime  enoxaparin Injectable 40 milliGRAM(s) SubCutaneous every 24 hours  ferrous    sulfate 325 milliGRAM(s) Oral three times a day  folic acid 1 milliGRAM(s) Oral daily  metoprolol tartrate 25 milliGRAM(s) Oral two times a day  multivitamin 1 Tablet(s) Oral daily  oxyCODONE    IR 5 milliGRAM(s) Oral every 4 hours PRN  oxyCODONE    IR 10 milliGRAM(s) Oral every 4 hours PRN  pantoprazole    Tablet 40 milliGRAM(s) Oral daily  polyethylene glycol 3350 17 Gram(s) Oral daily  potassium chloride   Powder 40 milliEquivalent(s) Oral once  senna 2 Tablet(s) Oral at bedtime  sodium chloride 0.9% lock flush 3 milliLiter(s) IV Push every 8 hours  torsemide 40 milliGRAM(s) Oral daily          Physical Exam:  T(C): 36.6 (03-19-22 @ 09:16), Max: 36.7 (03-18-22 @ 16:29)  HR: 78 (03-19-22 @ 09:16) (78 - 89)  BP: 128/68 (03-19-22 @ 09:16) (98/60 - 128/68)  RR: 18 (03-19-22 @ 09:16) (18 - 18)  SpO2: 97% (03-19-22 @ 09:16) (92% - 97%)  Wt(kg): --  General: Appears uncomfortable  Neck: No JVD  CVS: nl s1s2, no s3  Pulm: CTA b/l  Abd: soft, non-tender  Ext: No c/c/e  Neuro A&O x3  Psych: Normal affect      Labs:   19 Mar 2022 05:59    142    |  102    |  26.1   ----------------------------<  103    3.6     |  27.0   |  0.95     Ca    8.0        19 Mar 2022 05:59  Mg     2.3       19 Mar 2022 05:59                            9.0    13.26 )-----------( 204      ( 19 Mar 2022 05:59 )             27.4               Assessment:  66M with hx of HTN. HLD, vamshi reduced LVEF, who came electively for a C was found to have severe 3VD CAD, was surgically revascularized with CABG  (LIMA-LAD, Radial-Ramus, SVG-OM2), Cardiology service called for co-management in the postoperative stay.   -Patient with a lot of nausea but otherwise appears well.  No CHF.    Plan:  1. Recommend Zofran for nausea.  2. Continue current CV meds at current doses.  3. OOB.  4. D/c planning when nausea improved.  5. Supportive care per CTS.

## 2022-03-19 NOTE — PROGRESS NOTE ADULT - SUBJECTIVE AND OBJECTIVE BOX
POD #4 s/p CABG x 3 (LIMA-LAD, Right Radial-Ramus, SVG-OM2), and HELLEN Clip    PAST MEDICAL & SURGICAL HISTORY:  HTN (hypertension)  HLD (hyperlipidemia)  H/O carpal tunnel repair  S/P tonsillectomy    Brief Hospital Course: 66M, PMH of HTN, HLD, family history of CAD with intermittent persistent chest pain and abnormal NST and reduced EF 45-50%, now s/p C3L (LIMA-LAD, R radial-Ramus, SVG-OM2), HELLEN clip, postop course notable for postop acute blood loss anemia requiring PRBC.    Significant recent/past 24 hr events: No overnight events reported.    Subjective: Patient lying in bed in NAD. +Tolerating diet. +Passing gas. +Pain currently controlled. Denies fevers, chills, lightheadedness, dizziness, HA, CP, palpitations, SOB, cough, abdominal pain, N/V, diarrhea, numbness/tingling in extremities, or any other acute complaints. ROS negative x 10 systems except as noted above.    MEDICATIONS  (STANDING):  acetaminophen     Tablet .. 975 milliGRAM(s) Oral every 8 hours  amLODIPine   Tablet 2.5 milliGRAM(s) Oral daily  ascorbic acid 500 milliGRAM(s) Oral daily  aspirin  chewable 81 milliGRAM(s) Oral daily  atorvastatin 80 milliGRAM(s) Oral at bedtime  enoxaparin Injectable 40 milliGRAM(s) SubCutaneous every 24 hours  ferrous    sulfate 325 milliGRAM(s) Oral three times a day  folic acid 1 milliGRAM(s) Oral daily  metoprolol tartrate 25 milliGRAM(s) Oral two times a day  multivitamin 1 Tablet(s) Oral daily  pantoprazole    Tablet 40 milliGRAM(s) Oral daily  polyethylene glycol 3350 17 Gram(s) Oral daily  senna 2 Tablet(s) Oral at bedtime  sodium chloride 0.9% lock flush 3 milliLiter(s) IV Push every 8 hours  torsemide 40 milliGRAM(s) Oral daily    MEDICATIONS  (PRN):  oxyCODONE    IR 5 milliGRAM(s) Oral every 4 hours PRN Moderate Pain (4 - 6)  oxyCODONE    IR 10 milliGRAM(s) Oral every 4 hours PRN Severe Pain (7 - 10)    Allergies: naproxen (Unknown)    Vitals   T(C): 36.7 (18 Mar 2022 21:00), Max: 37 (18 Mar 2022 05:00)  T(F): 98 (18 Mar 2022 21:00), Max: 98.6 (18 Mar 2022 05:00)  HR: 86 (18 Mar 2022 21:00) (81 - 89)  BP: 102/71 (18 Mar 2022 21:00) (98/60 - 126/86)  RR: 18 (18 Mar 2022 21:00) (18 - 18)  SpO2: 94% (18 Mar 2022 21:00) (92% - 99%)    I&O's Detail    17 Mar 2022 07:01  -  18 Mar 2022 07:00  --------------------------------------------------------  IN:    IV PiggyBack: 50 mL    IV PiggyBack: 500 mL    Oral Fluid: 900 mL  Total IN: 1450 mL    OUT:    Chest Tube (mL): 50 mL    Chest Tube (mL): 325 mL    Chest Tube (mL): 99 mL    Indwelling Catheter - Urethral (mL): 530 mL    Voided (mL): 1980 mL  Total OUT: 2984 mL    Total NET: -1534 mL      18 Mar 2022 07:01  -  19 Mar 2022 00:46  --------------------------------------------------------  IN:    Oral Fluid: 620 mL  Total IN: 620 mL    OUT:    Chest Tube (mL): 18 mL    Chest Tube (mL): 40 mL    Voided (mL): 2200 mL  Total OUT: 2258 mL    Total NET: -1638 mL    Physical Exam  Neuro: A+O x 3, non-focal, speech clear and intact  HEENT:  NCAT, No conjuctival edema or icterus, no thrush.    Neck:  Supple, trachea midline  Pulm: +Diminished BSs b/l bases, no rales/rhonchi/wheezing, no accessory muscle use noted  Chest:        mediastinal CT and left pleural CT all with dressings intact and no air leak, no subQ emphysema  CV: regular rate, regular rhythm, +S1S2, no murmur or rub noted  Abd: soft, NT, ND, + BS  Ext: DANIELS x 4, trace LE edema b/l, no cyanosis, distal motor/neuro/circ intact  Skin: warm, dry, perfused  Incisions: midsternal mepilex C/D/I, sternum stable, RUE radial site and RLE vein harvest site C/D/I w/ dressing and Ace wrap    LABS                        8.7    15.46 )-----------( 146      ( 18 Mar 2022 06:28 )             26.9     03-18    141  |  104  |  23.4<H>  ----------------------------<  117<H>  3.5   |  27.0  |  1.01    Ca    7.8<L>      18 Mar 2022 06:28  Mg     2.3     03-18    POCT Blood Glucose.: 147 mg/dL (03-18-22 @ 22:28)  POCT Blood Glucose.: 121 mg/dL (03-18-22 @ 17:59)  POCT Blood Glucose.: 121 mg/dL (03-18-22 @ 12:05)  POCT Blood Glucose.: 129 mg/dL (03-18-22 @ 08:11)    Last CXR:  < from: Xray Chest 1 View- PORTABLE-Routine (Xray Chest 1 View- PORTABLE-Routine in AM.) (03.18.22 @ 05:58) >  Heart size is stable. Improved aeration left lung base. Right lung grossly clear. Question of a trace left apical pneumothorax. Left-sided chest tube in place.  IMPRESSION: Improved aeration left lung base. Question trace left apical pneumothorax  < end of copied text >

## 2022-03-20 ENCOUNTER — TRANSCRIPTION ENCOUNTER (OUTPATIENT)
Age: 67
End: 2022-03-20

## 2022-03-20 VITALS
RESPIRATION RATE: 18 BRPM | DIASTOLIC BLOOD PRESSURE: 74 MMHG | TEMPERATURE: 98 F | SYSTOLIC BLOOD PRESSURE: 134 MMHG | HEART RATE: 76 BPM | OXYGEN SATURATION: 96 %

## 2022-03-20 LAB
ALBUMIN SERPL ELPH-MCNC: 3.1 G/DL — LOW (ref 3.3–5.2)
ALP SERPL-CCNC: 61 U/L — SIGNIFICANT CHANGE UP (ref 40–120)
ALT FLD-CCNC: 59 U/L — HIGH
AMYLASE P1 CFR SERPL: 43 U/L — SIGNIFICANT CHANGE UP (ref 36–128)
ANION GAP SERPL CALC-SCNC: 13 MMOL/L — SIGNIFICANT CHANGE UP (ref 5–17)
AST SERPL-CCNC: 45 U/L — HIGH
BILIRUB DIRECT SERPL-MCNC: 0.1 MG/DL — SIGNIFICANT CHANGE UP (ref 0–0.3)
BILIRUB INDIRECT FLD-MCNC: 0.5 MG/DL — SIGNIFICANT CHANGE UP (ref 0.2–1)
BILIRUB SERPL-MCNC: 0.7 MG/DL — SIGNIFICANT CHANGE UP (ref 0.4–2)
BUN SERPL-MCNC: 23.3 MG/DL — HIGH (ref 8–20)
CALCIUM SERPL-MCNC: 8.2 MG/DL — LOW (ref 8.6–10.2)
CHLORIDE SERPL-SCNC: 96 MMOL/L — LOW (ref 98–107)
CO2 SERPL-SCNC: 29 MMOL/L — SIGNIFICANT CHANGE UP (ref 22–29)
CREAT SERPL-MCNC: 0.82 MG/DL — SIGNIFICANT CHANGE UP (ref 0.5–1.3)
EGFR: 97 ML/MIN/1.73M2 — SIGNIFICANT CHANGE UP
GLUCOSE SERPL-MCNC: 105 MG/DL — HIGH (ref 70–99)
HCT VFR BLD CALC: 28.3 % — LOW (ref 39–50)
HGB BLD-MCNC: 9.1 G/DL — LOW (ref 13–17)
LACTATE SERPL-SCNC: 1.3 MMOL/L — SIGNIFICANT CHANGE UP (ref 0.5–2)
LIDOCAIN IGE QN: 81 U/L — HIGH (ref 22–51)
MAGNESIUM SERPL-MCNC: 2.6 MG/DL — SIGNIFICANT CHANGE UP (ref 1.6–2.6)
MCHC RBC-ENTMCNC: 29.3 PG — SIGNIFICANT CHANGE UP (ref 27–34)
MCHC RBC-ENTMCNC: 32.2 GM/DL — SIGNIFICANT CHANGE UP (ref 32–36)
MCV RBC AUTO: 91 FL — SIGNIFICANT CHANGE UP (ref 80–100)
PLATELET # BLD AUTO: 283 K/UL — SIGNIFICANT CHANGE UP (ref 150–400)
POTASSIUM SERPL-MCNC: 3.3 MMOL/L — LOW (ref 3.5–5.3)
POTASSIUM SERPL-SCNC: 3.3 MMOL/L — LOW (ref 3.5–5.3)
PROT SERPL-MCNC: 6.1 G/DL — LOW (ref 6.6–8.7)
RBC # BLD: 3.11 M/UL — LOW (ref 4.2–5.8)
RBC # FLD: 13.5 % — SIGNIFICANT CHANGE UP (ref 10.3–14.5)
SODIUM SERPL-SCNC: 138 MMOL/L — SIGNIFICANT CHANGE UP (ref 135–145)
WBC # BLD: 11.8 K/UL — HIGH (ref 3.8–10.5)
WBC # FLD AUTO: 11.8 K/UL — HIGH (ref 3.8–10.5)

## 2022-03-20 PROCEDURE — 82553 CREATINE MB FRACTION: CPT

## 2022-03-20 PROCEDURE — 36430 TRANSFUSION BLD/BLD COMPNT: CPT

## 2022-03-20 PROCEDURE — 82150 ASSAY OF AMYLASE: CPT

## 2022-03-20 PROCEDURE — 97163 PT EVAL HIGH COMPLEX 45 MIN: CPT

## 2022-03-20 PROCEDURE — 84480 ASSAY TRIIODOTHYRONINE (T3): CPT

## 2022-03-20 PROCEDURE — P9016: CPT

## 2022-03-20 PROCEDURE — 93312 ECHO TRANSESOPHAGEAL: CPT

## 2022-03-20 PROCEDURE — 87641 MR-STAPH DNA AMP PROBE: CPT

## 2022-03-20 PROCEDURE — 93306 TTE W/DOPPLER COMPLETE: CPT

## 2022-03-20 PROCEDURE — 85610 PROTHROMBIN TIME: CPT

## 2022-03-20 PROCEDURE — U0003: CPT

## 2022-03-20 PROCEDURE — 82330 ASSAY OF CALCIUM: CPT

## 2022-03-20 PROCEDURE — C1887: CPT

## 2022-03-20 PROCEDURE — C1769: CPT

## 2022-03-20 PROCEDURE — 94002 VENT MGMT INPAT INIT DAY: CPT

## 2022-03-20 PROCEDURE — C1894: CPT

## 2022-03-20 PROCEDURE — 97167 OT EVAL HIGH COMPLEX 60 MIN: CPT

## 2022-03-20 PROCEDURE — 82550 ASSAY OF CK (CPK): CPT

## 2022-03-20 PROCEDURE — 71045 X-RAY EXAM CHEST 1 VIEW: CPT

## 2022-03-20 PROCEDURE — 80053 COMPREHEN METABOLIC PANEL: CPT

## 2022-03-20 PROCEDURE — 85014 HEMATOCRIT: CPT

## 2022-03-20 PROCEDURE — 85025 COMPLETE CBC W/AUTO DIFF WBC: CPT

## 2022-03-20 PROCEDURE — 83605 ASSAY OF LACTIC ACID: CPT

## 2022-03-20 PROCEDURE — 94760 N-INVAS EAR/PLS OXIMETRY 1: CPT

## 2022-03-20 PROCEDURE — U0005: CPT

## 2022-03-20 PROCEDURE — 85730 THROMBOPLASTIN TIME PARTIAL: CPT

## 2022-03-20 PROCEDURE — 71045 X-RAY EXAM CHEST 1 VIEW: CPT | Mod: 26,76

## 2022-03-20 PROCEDURE — 84436 ASSAY OF TOTAL THYROXINE: CPT

## 2022-03-20 PROCEDURE — 83036 HEMOGLOBIN GLYCOSYLATED A1C: CPT

## 2022-03-20 PROCEDURE — 86850 RBC ANTIBODY SCREEN: CPT

## 2022-03-20 PROCEDURE — 86923 COMPATIBILITY TEST ELECTRIC: CPT

## 2022-03-20 PROCEDURE — 36415 COLL VENOUS BLD VENIPUNCTURE: CPT

## 2022-03-20 PROCEDURE — 82435 ASSAY OF BLOOD CHLORIDE: CPT

## 2022-03-20 PROCEDURE — 82947 ASSAY GLUCOSE BLOOD QUANT: CPT

## 2022-03-20 PROCEDURE — 93320 DOPPLER ECHO COMPLETE: CPT

## 2022-03-20 PROCEDURE — 82803 BLOOD GASES ANY COMBINATION: CPT

## 2022-03-20 PROCEDURE — 83735 ASSAY OF MAGNESIUM: CPT

## 2022-03-20 PROCEDURE — P9045: CPT

## 2022-03-20 PROCEDURE — 93454 CORONARY ARTERY ANGIO S&I: CPT

## 2022-03-20 PROCEDURE — 87640 STAPH A DNA AMP PROBE: CPT

## 2022-03-20 PROCEDURE — 86901 BLOOD TYPING SEROLOGIC RH(D): CPT

## 2022-03-20 PROCEDURE — 83690 ASSAY OF LIPASE: CPT

## 2022-03-20 PROCEDURE — 84484 ASSAY OF TROPONIN QUANT: CPT

## 2022-03-20 PROCEDURE — 93880 EXTRACRANIAL BILAT STUDY: CPT

## 2022-03-20 PROCEDURE — 84295 ASSAY OF SERUM SODIUM: CPT

## 2022-03-20 PROCEDURE — 85018 HEMOGLOBIN: CPT

## 2022-03-20 PROCEDURE — 84443 ASSAY THYROID STIM HORMONE: CPT

## 2022-03-20 PROCEDURE — 84132 ASSAY OF SERUM POTASSIUM: CPT

## 2022-03-20 PROCEDURE — 99024 POSTOP FOLLOW-UP VISIT: CPT

## 2022-03-20 PROCEDURE — 80061 LIPID PANEL: CPT

## 2022-03-20 PROCEDURE — 80076 HEPATIC FUNCTION PANEL: CPT

## 2022-03-20 PROCEDURE — 82962 GLUCOSE BLOOD TEST: CPT

## 2022-03-20 PROCEDURE — C1889: CPT

## 2022-03-20 PROCEDURE — 93005 ELECTROCARDIOGRAM TRACING: CPT

## 2022-03-20 PROCEDURE — 93325 DOPPLER ECHO COLOR FLOW MAPG: CPT

## 2022-03-20 PROCEDURE — 81001 URINALYSIS AUTO W/SCOPE: CPT

## 2022-03-20 PROCEDURE — 80048 BASIC METABOLIC PNL TOTAL CA: CPT

## 2022-03-20 PROCEDURE — 99152 MOD SED SAME PHYS/QHP 5/>YRS: CPT

## 2022-03-20 PROCEDURE — 86891 AUTOLOGOUS BLOOD OP SALVAGE: CPT

## 2022-03-20 PROCEDURE — 85027 COMPLETE CBC AUTOMATED: CPT

## 2022-03-20 PROCEDURE — 86900 BLOOD TYPING SEROLOGIC ABO: CPT

## 2022-03-20 PROCEDURE — 94010 BREATHING CAPACITY TEST: CPT

## 2022-03-20 PROCEDURE — 99232 SBSQ HOSP IP/OBS MODERATE 35: CPT

## 2022-03-20 RX ORDER — ATORVASTATIN CALCIUM 80 MG/1
1 TABLET, FILM COATED ORAL
Qty: 0 | Refills: 0 | DISCHARGE

## 2022-03-20 RX ORDER — AMLODIPINE BESYLATE 2.5 MG/1
1 TABLET ORAL
Qty: 30 | Refills: 2
Start: 2022-03-20 | End: 2022-06-17

## 2022-03-20 RX ORDER — METOPROLOL TARTRATE 50 MG
1 TABLET ORAL
Qty: 30 | Refills: 1
Start: 2022-03-20 | End: 2022-05-18

## 2022-03-20 RX ORDER — ATORVASTATIN CALCIUM 80 MG/1
1 TABLET, FILM COATED ORAL
Qty: 30 | Refills: 1
Start: 2022-03-20 | End: 2022-05-18

## 2022-03-20 RX ORDER — POTASSIUM CHLORIDE 20 MEQ
40 PACKET (EA) ORAL ONCE
Refills: 0 | Status: DISCONTINUED | OUTPATIENT
Start: 2022-03-20 | End: 2022-03-20

## 2022-03-20 RX ORDER — ASPIRIN/CALCIUM CARB/MAGNESIUM 324 MG
1 TABLET ORAL
Qty: 30 | Refills: 1
Start: 2022-03-20 | End: 2022-05-18

## 2022-03-20 RX ORDER — METOPROLOL TARTRATE 50 MG
1 TABLET ORAL
Qty: 0 | Refills: 0 | DISCHARGE

## 2022-03-20 RX ORDER — ASPIRIN/CALCIUM CARB/MAGNESIUM 324 MG
0 TABLET ORAL
Qty: 0 | Refills: 0 | DISCHARGE

## 2022-03-20 RX ORDER — POTASSIUM CHLORIDE 20 MEQ
1 PACKET (EA) ORAL
Qty: 7 | Refills: 0
Start: 2022-03-20 | End: 2022-03-26

## 2022-03-20 RX ORDER — LOSARTAN/HYDROCHLOROTHIAZIDE 100MG-25MG
1 TABLET ORAL
Qty: 0 | Refills: 0 | DISCHARGE

## 2022-03-20 RX ORDER — POTASSIUM CHLORIDE 20 MEQ
40 PACKET (EA) ORAL
Refills: 0 | Status: COMPLETED | OUTPATIENT
Start: 2022-03-20 | End: 2022-03-20

## 2022-03-20 RX ORDER — SENNA PLUS 8.6 MG/1
2 TABLET ORAL
Qty: 0 | Refills: 0 | DISCHARGE
Start: 2022-03-20

## 2022-03-20 RX ADMIN — Medication 325 MILLIGRAM(S): at 05:32

## 2022-03-20 RX ADMIN — SODIUM CHLORIDE 3 MILLILITER(S): 9 INJECTION INTRAMUSCULAR; INTRAVENOUS; SUBCUTANEOUS at 05:28

## 2022-03-20 RX ADMIN — Medication 25 MILLIGRAM(S): at 05:32

## 2022-03-20 RX ADMIN — AMLODIPINE BESYLATE 2.5 MILLIGRAM(S): 2.5 TABLET ORAL at 05:32

## 2022-03-20 RX ADMIN — Medication 40 MILLIEQUIVALENT(S): at 09:21

## 2022-03-20 RX ADMIN — Medication 40 MILLIEQUIVALENT(S): at 10:39

## 2022-03-20 RX ADMIN — Medication 40 MILLIGRAM(S): at 05:33

## 2022-03-20 NOTE — PROGRESS NOTE ADULT - PROBLEM SELECTOR PLAN 4
Lovenox and SCDs for DVT prophylaxis.  Protonix for GI prophylaxis.  Continue with bowel regimen.    Plan to be discussed / reviewed with CT Surgery attending / team during AM rounds.
lovneox and scds for dvt ppx  protonix for GI ppx
Lovenox and SCDs for DVT prophylaxis.  Protonix for GI prophylaxis.  Continue with bowel regimen PRN constipation.    Plan to be discussed / reviewed with CT Surgery attending / team during AM rounds.

## 2022-03-20 NOTE — DISCHARGE NOTE NURSING/CASE MANAGEMENT/SOCIAL WORK - NSDCPEFALRISK_GEN_ALL_CORE
For information on Fall & Injury Prevention, visit: [email or web address] OR  [email or web address] OR  https://www.cdc.gov/steadi/patient.html

## 2022-03-20 NOTE — PROGRESS NOTE ADULT - SUBJECTIVE AND OBJECTIVE BOX
TREVOR JAIN  [id number]        Chief Complaint:  CAD s/p CABG    Interval History:  Nausea imoproved Denies CP, SOB, palps.    Tele:  SR, No events          amLODIPine   Tablet 2.5 milliGRAM(s) Oral daily  ascorbic acid 500 milliGRAM(s) Oral daily  aspirin  chewable 81 milliGRAM(s) Oral daily  atorvastatin 80 milliGRAM(s) Oral at bedtime  enoxaparin Injectable 40 milliGRAM(s) SubCutaneous every 24 hours  ferrous    sulfate 325 milliGRAM(s) Oral three times a day  folic acid 1 milliGRAM(s) Oral daily  metoprolol tartrate 25 milliGRAM(s) Oral two times a day  multivitamin 1 Tablet(s) Oral daily  ondansetron Injectable 4 milliGRAM(s) IV Push every 8 hours  oxyCODONE    IR 5 milliGRAM(s) Oral every 4 hours PRN  oxyCODONE    IR 10 milliGRAM(s) Oral every 4 hours PRN  pantoprazole    Tablet 40 milliGRAM(s) Oral daily  polyethylene glycol 3350 17 Gram(s) Oral daily  senna 2 Tablet(s) Oral at bedtime  sodium chloride 0.9% lock flush 3 milliLiter(s) IV Push every 8 hours  torsemide 40 milliGRAM(s) Oral daily          Physical Exam:  T(C): 36.6 (03-20-22 @ 05:40), Max: 36.8 (03-19-22 @ 23:36)  HR: 76 (03-20-22 @ 05:40) (76 - 93)  BP: 134/74 (03-20-22 @ 05:40) (113/74 - 134/74)  RR: 18 (03-20-22 @ 05:40) (18 - 18)  SpO2: 96% (03-20-22 @ 05:40) (96% - 98%)  Wt(kg): --  General: Comfortable in NAD  HEENT: MMM, sclera anicteric  Resp: CTA bilaterally  CVS: nl s1s2, rrr, no s3/JVD  Abd: soft, NT, ND  Ext: No c/c/e, left radial scar clean/intact  Neuro A&O x3  Psych: Normal affect    I&O's Summary    19 Mar 2022 07:01  -  20 Mar 2022 07:00  --------------------------------------------------------  IN: 800 mL / OUT: 2395 mL / NET: -1595 mL          Labs:   20 Mar 2022 06:21    138    |  96     |  23.3   ----------------------------<  105    3.3     |  29.0   |  0.82     Ca    8.2        20 Mar 2022 06:21  Mg     2.6       20 Mar 2022 06:21    TPro  6.1    /  Alb  3.1    /  TBili  0.7    /  DBili  0.1    /  AST  45     /  ALT  59     /  AlkPhos  61     20 Mar 2022 06:21                          9.1    11.80 )-----------( 283      ( 20 Mar 2022 06:21 )             28.3       ECHO 3/2022:   1. The left atrium is normal in size.   2. Segmental wall motion abnormalities in RCA territory.   3. Left ventricular ejection fraction, by visual estimation, is 50 to   55%. Grade I diastolic dysfunction.   4. The right atrium is normal in size.   5. Normal right ventricular size and function.   6. No significant valvular abnormality.   7. There is no evidence of pericardial effusion.        66M with hx of HTN. HLD, vamshi reduced LVEF, who came electively for a LHC was found to have severe 3VD CAD, was surgically revascularized with CABG  (LIMA-LAD, Radial-Ramus, SVG-OM2), Cardiology service called for co-management in the postoperative stay.   -Patient with a lot of nausea post op but otherwise appears well.  No CHF and remains in SR  -Nausea improved, ready for discharge    Plan:  1. CV stable for discharge   2. Continue current CV meds at current doses.  3. Office follow up 2 weeks   4. Supportive care and follow up with  CTS.          Tacos Bermeo MD

## 2022-03-20 NOTE — PROGRESS NOTE ADULT - PROVIDER SPECIALTY LIST ADULT
Cardiology
CT Surgery

## 2022-03-20 NOTE — DISCHARGE NOTE PROVIDER - HOSPITAL COURSE
66 year old male patient with a medial history of HTN, HLD, family history of CAD with intermittent persistent chest pain and abnormal NST and reduced EF 45-50%, now s/p C3L (LIMA-LAD, R radial-Ramus, SVG-OM2), HELLEN clip on 3/15/22 with Dr. Murdock. Postop course notable for postop acute blood loss anemia requiring PRBC. Patient remains hemodynamically stable and stable from a respiratory standpoint at this time. Plan for discharge Home later today as per Dr. Tee, covering physician for Dr. Murdock. 66 year old male patient with a medial history of HTN, HLD, family history of CAD with intermittent persistent chest pain and abnormal NST and reduced EF 45-50%, now s/p C3L (LIMA-LAD, R radial-Ramus, SVG-OM2), HELLEN clip on 3/15/22 with Dr. Murdock. Postop course notable for postop acute blood loss anemia requiring PRBC. Patient had nausea POD#4 that resolved after patient had BM (CMP, Lipase, Amylase, Lactate all WNL). Patient remains hemodynamically stable and stable from a respiratory standpoint at this time. Plan for discharge Home later today as per Dr. Tee, covering physician for Dr. Murdock.     Of note, patient was being diuresed while inpatient with Torsemide 40mg PO. He is being discharged on Torsemide 20mg PO once daily for one week for trace b/l LE edema.

## 2022-03-20 NOTE — DISCHARGE NOTE NURSING/CASE MANAGEMENT/SOCIAL WORK - NSDCFUADDAPPT_GEN_ALL_CORE_FT
Please follow up with Dr. Murdock by calling the CT Surgery office at [phone] on the next open business day to make an appointment.  The cardiac surgery office is located at [facility], first floor. Take a left at the end of the lobby until the end of that martinez (past the elevator bank). Make a left and the office is on your right across from the elevators.    Your Care Navigator Nurse Practitioner will be in touch to see you in your home within a few days from discharge. The Follow Your Heart program can help ensure you understand your medications, discharge instructions and answer any questions you may have at that time. They are also a great source to address concerns during the day and may be reached at [phone].    Please follow up with your Cardiologist and Primary Care Physician 2-4 weeks from discharge.

## 2022-03-20 NOTE — DISCHARGE NOTE PROVIDER - NSDCFUADDAPPT_GEN_ALL_CORE_FT
Please follow up with Dr. Murdock by calling the CT Surgery office at [phone] on the next open business day to make an appointment.  The cardiac surgery office is located at [facility], first floor. Take a left at the end of the lobby until the end of that martinez (past the elevator bank). Make a left and the office is on your right across from the elevators.    Your Care Navigator Nurse Practitioner will be in touch to see you in your home within a few days from discharge. The Follow Your Heart program can help ensure you understand your medications, discharge instructions and answer any questions you may have at that time. They are also a great source to address concerns during the day and may be reached at [phone].    Please follow up with your Cardiologist and Primary Care Physician 2-4 weeks from discharge.

## 2022-03-20 NOTE — DISCHARGE NOTE NURSING/CASE MANAGEMENT/SOCIAL WORK - PATIENT PORTAL LINK FT
You can access the FollowMyHealth Patient Portal offered by [facility] by registering at the following website: [email or web address]. By joining [email or web address]’s FollowMyHealth portal, you will also be able to view your health information using other applications (apps) compatible with our system.

## 2022-03-20 NOTE — DISCHARGE NOTE PROVIDER - NSDCCPTREATMENT_GEN_ALL_CORE_FT
PRINCIPAL PROCEDURE  Procedure: CABG, 2 arterial and 1 venous  Findings and Treatment: CABG x 3 Utilizing the Left Internal Mammary Artery, Endoscopically Harvested Right Radial Artery & Endoscopically Harvested Greater Saphenous Vein Graft form the Right Thigh      SECONDARY PROCEDURE  Procedure: Clipping of left atrial appendage  Findings and Treatment:

## 2022-03-20 NOTE — PROGRESS NOTE ADULT - PROBLEM SELECTOR PLAN 2
Continue Lopressor and Norvasc, titrate as needed.
cont loperssor/norvasc, titrate as needed
Continue Lopressor and Norvasc, titrate as needed.

## 2022-03-20 NOTE — PROGRESS NOTE ADULT - ASSESSMENT
66M, PMH of HTN, HLD, family history of CAD with intermittent persistent chest pain and abnormal NST and reduced EF 45-50%, now s/p C3L (LIMA-LAD, R radial-Ramus, SVG-OM2), HELLEN clip, postop course notable for postop acute blood loss anemia requiring PRBC.
66M, PMH of HTN, HLD, family history of CAD with intermittent persistent chest pain and abnormal NST and reduced EF 45-50%, now s/p C3L (LIMA-LAD, R radial-Ramus, SVG-OM2), HELLEN clip, postop course notable for postop acute blood loss anemia requiring PRBC.
66M, PMH of HTN, HLD, family history of CAD with intermittent persistent chest pain and abnormal NST and reduced EF 45-50%, now s/p C3L (LIMA-LAD, R radial-Ramus, SVG-OM2), HELLEN clip, postop course notable for postop acute blood loss anemia requiring PRBC;
HPI:  Narrative: 66 year old male with PMH of HTN, HLD, family history of CAD with intermittent persistent chest pain and abnormal NST and reduced EF 45-50% now s/p CABG x 3 on 3/15
 66 year old male with PMH of HTN, HLD, family history of CAD with intermittent persistent chest pain and abnormal NST and reduced EF 45-50% now s/p CABG x 3 on 3/15

## 2022-03-20 NOTE — DISCHARGE NOTE PROVIDER - NSDCFUADDINST_GEN_ALL_CORE_FT
Please call the Cardiothoracic Surgery office at [phone] if you are experiencing any shortness of breath, chest pain, fevers or chills, drainage from the incisions, persistent nausea, vomiting or if you have any questions about your medications. If the symptoms are severe, call 911 and go to the nearest hospital. You can also call [phone] for an emergency [facility] ambulance, which will take you to the closest [facility].    If you need any assistance for making any appointments for a new consult or referral in any specialty, please call our [facility] Clinical Coordination Center at [phone].

## 2022-03-20 NOTE — PROGRESS NOTE ADULT - PROBLEM SELECTOR PLAN 3
s/p PRBC 3/16  remains hemodynamically stable  transfuse PRN  supplements added
S/p PRBC 3/16.  Remains hemodynamically stable.  Transfuse PRN  Ferrous sulfate, folic acid, and vit c.
S/p PRBC 3/16.  Remains hemodynamically stable.  Transfuse PRN  Ferrous sulfate, folic acid, and vit c.

## 2022-03-20 NOTE — PROGRESS NOTE ADULT - SUBJECTIVE AND OBJECTIVE BOX
POD #5 s/p CABG x 3 (LIMA-LAD, Right Radial-Ramus, SVG-OM2), and HELLEN Clip    PAST MEDICAL & SURGICAL HISTORY:  HTN (hypertension)  HLD (hyperlipidemia)  H/O carpal tunnel repair  S/P tonsillectomy    Brief Hospital Course: 66M, PMH of HTN, HLD, family history of CAD with intermittent persistent chest pain and abnormal NST and reduced EF 45-50%, now s/p C3L (LIMA-LAD, R radial-Ramus, SVG-OM2), HELLEN clip, postop course notable for postop acute blood loss anemia requiring PRBC.    Significant recent/past 24 hr events: No overnight events reported.    Subjective: Patient lying in bed in NAD. +Tolerating diet. +Passing BMs since surgery. +Pain currently controlled. Denies fevers, chills, lightheadedness, dizziness, HA, CP, palpitations, SOB, cough, abdominal pain, N/V, diarrhea, numbness/tingling in extremities, or any other acute complaints. ROS negative x 10 systems except as noted above.    MEDICATIONS  (STANDING):  amLODIPine   Tablet 2.5 milliGRAM(s) Oral daily  ascorbic acid 500 milliGRAM(s) Oral daily  aspirin  chewable 81 milliGRAM(s) Oral daily  atorvastatin 80 milliGRAM(s) Oral at bedtime  enoxaparin Injectable 40 milliGRAM(s) SubCutaneous every 24 hours  ferrous    sulfate 325 milliGRAM(s) Oral three times a day  folic acid 1 milliGRAM(s) Oral daily  metoprolol tartrate 25 milliGRAM(s) Oral two times a day  multivitamin 1 Tablet(s) Oral daily  ondansetron Injectable 4 milliGRAM(s) IV Push every 8 hours  pantoprazole    Tablet 40 milliGRAM(s) Oral daily  polyethylene glycol 3350 17 Gram(s) Oral daily  senna 2 Tablet(s) Oral at bedtime  sodium chloride 0.9% lock flush 3 milliLiter(s) IV Push every 8 hours  torsemide 40 milliGRAM(s) Oral daily    MEDICATIONS  (PRN):  oxyCODONE    IR 5 milliGRAM(s) Oral every 4 hours PRN Moderate Pain (4 - 6)  oxyCODONE    IR 10 milliGRAM(s) Oral every 4 hours PRN Severe Pain (7 - 10)    Allergies: naproxen (Unknown)    Vitals   T(C): 36.8 (19 Mar 2022 23:36), Max: 36.8 (19 Mar 2022 23:36)  T(F): 98.3 (19 Mar 2022 23:36), Max: 98.3 (19 Mar 2022 23:36)  HR: 93 (19 Mar 2022 23:36) (77 - 93)  BP: 126/75 (19 Mar 2022 23:36) (113/74 - 128/68)  RR: 18 (19 Mar 2022 23:36) (18 - 18)  SpO2: 98% (19 Mar 2022 23:36) (95% - 98%)    I&O's Detail    18 Mar 2022 07:01  -  19 Mar 2022 07:00  --------------------------------------------------------  IN:    Oral Fluid: 620 mL  Total IN: 620 mL    OUT:    Chest Tube (mL): 70 mL    Chest Tube (mL): 28 mL    Voided (mL): 2200 mL  Total OUT: 2298 mL    Total NET: -1678 mL      19 Mar 2022 07:01  -  20 Mar 2022 00:53  --------------------------------------------------------  IN:    Oral Fluid: 680 mL  Total IN: 680 mL    OUT:    Chest Tube (mL): 10 mL    Chest Tube (mL): 150 mL    Voided (mL): 1700 mL  Total OUT: 1860 mL    Total NET: -1180 mL    Physical Exam  Neuro: A+O x 3, non-focal, speech clear and intact  HEENT:  NCAT, No conjuctival edema or icterus, no thrush.    Neck:  Supple, trachea midline  Pulm: +Diminished BSs b/l bases, no rales/rhonchi/wheezing, no accessory muscle use noted  Chest: Left pleural CT all with dressings intact and no air leak, no subQ emphysema  CV: regular rate, regular rhythm, +S1S2, no murmur or rub noted  Abd: soft, NT, ND, + BS  Ext: DANIELS x 4, trace LE edema b/l, no cyanosis, distal motor/neuro/circ intact  Skin: warm, dry, perfused  Incisions: midsternal mepilex C/D/I, sternum stable, RUE radial site and RLE vein harvest site C/D/I w/ dressing and Ace wrap    LABS                        8.9    13.63 )-----------( 235      ( 19 Mar 2022 12:34 )             27.8     03-19    142  |  102  |  26.1<H>  ----------------------------<  103<H>  3.6   |  27.0  |  0.95    Ca    8.0<L>      19 Mar 2022 05:59  Mg     2.3     03-19      Last CXR:  < from: Xray Chest 1 View- PORTABLE-Routine (Xray Chest 1 View- PORTABLE-Routine in AM.) (03.19.22 @ 06:03) >  Heart magnified by technique. Sternotomy and left atrial appendage clipping device again noted.  Persistent slight left base pleural pulmonary reaction. Left chest tube remains.  Present film shows a rather small left apical pneumothorax slightly increased from March 18.  IMPRESSION: Rather small left apical pneumothorax somewhat increased from one day prior.  < end of copied text >

## 2022-03-20 NOTE — PROGRESS NOTE ADULT - PROBLEM SELECTOR PLAN 1
-s/p CABG  -c/w norvasc to prevent radial artery graft spasm  - c/w Beta blocker as tolerated by HR and SBP  - c/w Lipitor for chronic graft patency prophylaxis  - c/w Aspirin for acute graft closure prophylaxis  - continue to Encourage PO intake  - Encourage OOB to chair and ambulation with PT  - Encourage deep breathing exercised and coughing  - Chest PT and IS use with bedside nurse  - SCDs & Lovenox for DVT proph
S/p C3L, HELLEN clip on 3/15/22.  Neurologically intact.  Hemodynamically stable.  Continue lopressor as tolerated by HR and BP.   Continue ASA and lipitor for graft patency.  Continue Norvasc for radial graft to prevent spasm.  Oxygenating well, coughing and deep breathing exercises/incentive spirometry encouraged.  D/c CT this morning for minimal output.   Continue diuresis PRN, replenish electrolytes PRN to keep K>4 and Mg>2.   Continue with PT, increase activity as tolerated.  Tylenol, Oxy PRN for analgesia.  Continue bowel regimen PRN constipation.   Case and plan to be reviewed / discussed with CT Surgery attending / team during AM rounds.
s/p C3L, LAAp clip 3/15  oob, ambulate, PT as tolerated, oxy/tylenol PRN pain   wean NC encourage Incentive Spirometry, chest PT deep breathing and coughing  hemodynamically stable, cont lopressor, no arrhythmias, possible remove PW and pericardial CT today (pending outpt)  cont asa and lipitor for graft patency  norvasc for radial graft to prevent spasm   tolerating diet, needs BM, cont bowel regimen  no h/o DM, tight glycemic control with LUIS FERNANDO, d/c' today if fingersticks remain stable, has required minimal coverage  cont supportive care  d/c home likely over weekend    plan to be d/w Dr. Murdock in AM rounds
Wean pressor as tolerated  CCB for RAG  Beta blocker as tolerated by HR and SBP  Lipitor for chronic graft patency prophylaxis  Aspirin for acute graft closure prophylaxis  Encourage PO intake  Encourage OOB to chair and ambulation with PT  Encourage deep breathing exercised and coughing  Chest PT and IS use with bedside nurse
S/p C3L, HELLEN clip on 3/15/22.  Neurologically intact.  Hemodynamically stable.  Continue lopressor as tolerated by HR and BP.   Continue ASA and lipitor for graft patency.  Continue Norvasc for radial graft to prevent spasm.  Oxygenating well, coughing and deep breathing exercises/incentive spirometry encouraged.  D/c CTs this morning for minimal output.   Continue diuresis PRN, replenish electrolytes PRN to keep K>4 and Mg>2.   Continue with PT, increase activity as tolerated.  Tylenol, Oxy PRN for analgesia.  Continue bowel regimen PRN constipation.   Case and plan to be reviewed / discussed with CT Surgery attending / team during AM rounds.

## 2022-03-20 NOTE — DISCHARGE NOTE PROVIDER - CARE PROVIDER_API CALL
Armani Murdock; JOLEEN)  Surgery; Thoracic and Cardiac Surgery  [address] [place] [address]  Phone: [phone]  Fax: [phone]  Follow Up Time:

## 2022-03-20 NOTE — DISCHARGE NOTE PROVIDER - NSDCMRMEDTOKEN_GEN_ALL_CORE_FT
aspirin 81 mg oral tablet: orally once a day  Lipitor 80 mg oral tablet: 1 tab(s) orally once a day  losartan-hydrochlorothiazide 100 mg-25 mg oral tablet: 1 tab(s) orally once a day  Toprol-XL 25 mg oral tablet, extended release: 1 tab(s) orally once a day   amLODIPine 2.5 mg oral tablet: 1 tab(s) orally once a day  aspirin 81 mg oral tablet: 1 tab(s) orally once a day   K-Tab 20 mEq oral tablet, extended release: 1 tab(s) orally once a day   Lipitor 80 mg oral tablet: 1 tab(s) orally once a day  metoprolol succinate 50 mg oral tablet, extended release: 1 tab(s) orally once a day  oxycodone-acetaminophen 5 mg-325 mg oral tablet: 1 tab(s) orally every 6 hours -for severe pain MDD:4  senna oral tablet: 2 tab(s) orally once a day (at bedtime)  torsemide 20 mg oral tablet: 1 tab(s) orally once a day

## 2022-03-20 NOTE — DISCHARGE NOTE PROVIDER - NSDCCPCAREPLAN_GEN_ALL_CORE_FT
PRINCIPAL DISCHARGE DIAGNOSIS  Diagnosis: CAD (coronary artery disease)  Assessment and Plan of Treatment: You underwent coronary artery bypass graft surgery 3/15/22   1. Take ALL of your medications as ordered. Fill your prescriptions the day you are discharged and take according to the schedule you were given. Continue to take a stool softener if you are taking narcotic pain medications. AVOID medications such as ibuprofen or naproxen if you have had bypass surgery. If you have any questions or are unable to fill the prescriptions, please call the office right away at [phone].  2. Shower daily. Clean all incisions daily while showering with warm water and mild soap, pat dry with a clean towel and do not cover with any dressings unless instructed to. No bathing, swimming in a pool or the ocean until instructed by MD.  DO NOT use creams or lotions on the wound.  3. We advise that you do not drive until instructed by MD.   4. You may not return to work until instructed by MD.   5. Please eat a low fat, low cholesterol, low salt diet. (No added/extra salt)  6. Weigh yourself every day in the morning and record it in the weight log in your red folder. Notify the office of any weight gain more than 2-3 pounds in 24 hours.  7. Continue breathing exercises several times a day. Continue to use your heart pillow.  8. No heavy lifting nothing greater than 5 pounds until cleared by MD.   9. Call / Notify MD any fever greater than 101.0, any drainage from incisions or if they become red, hot or very tender to the touch.  10. Increase activity as tolerated. Walk indoors and/or outdoors at least 3 times a day.      SECONDARY DISCHARGE DIAGNOSES  Diagnosis: Postoperative anemia due to acute blood loss  Assessment and Plan of Treatment: By taking iron (ferrous sulfate), folate and vitamin C as directed for one month, your red blood cell count should improve. Please take a stool softener with the medications as they may cause constipation.    Diagnosis: Benign essential HTN  Assessment and Plan of Treatment: - Take all medications as prescribed and listed on your discharge paperwork.  - Please follow up with your Cardiologist and Primary Care Physician 2-4 weeks from discharge.

## 2022-03-21 PROBLEM — I10 ESSENTIAL (PRIMARY) HYPERTENSION: Chronic | Status: ACTIVE | Noted: 2022-03-14

## 2022-03-21 PROBLEM — E78.5 HYPERLIPIDEMIA, UNSPECIFIED: Chronic | Status: ACTIVE | Noted: 2022-03-14

## 2022-03-21 RX ORDER — METOPROLOL SUCCINATE 25 MG/1
25 TABLET, EXTENDED RELEASE ORAL DAILY
Qty: 30 | Refills: 5 | Status: DISCONTINUED | COMMUNITY
Start: 2022-03-07 | End: 2022-03-21

## 2022-03-21 RX ORDER — LOSARTAN POTASSIUM AND HYDROCHLOROTHIAZIDE 25; 100 MG/1; MG/1
100-25 TABLET ORAL
Refills: 0 | Status: DISCONTINUED | COMMUNITY
End: 2022-03-21

## 2022-03-24 ENCOUNTER — NON-APPOINTMENT (OUTPATIENT)
Age: 67
End: 2022-03-24

## 2022-03-24 ENCOUNTER — TRANSCRIPTION ENCOUNTER (OUTPATIENT)
Age: 67
End: 2022-03-24

## 2022-03-24 ENCOUNTER — APPOINTMENT (OUTPATIENT)
Dept: CARDIOLOGY | Facility: CLINIC | Age: 67
End: 2022-03-24
Payer: COMMERCIAL

## 2022-03-24 ENCOUNTER — APPOINTMENT (OUTPATIENT)
Dept: CARE COORDINATION | Facility: HOME HEALTH | Age: 67
End: 2022-03-24
Payer: COMMERCIAL

## 2022-03-24 VITALS
SYSTOLIC BLOOD PRESSURE: 140 MMHG | BODY MASS INDEX: 28.7 KG/M2 | OXYGEN SATURATION: 96 % | HEIGHT: 71 IN | HEART RATE: 72 BPM | WEIGHT: 205 LBS | DIASTOLIC BLOOD PRESSURE: 88 MMHG | RESPIRATION RATE: 15 BRPM

## 2022-03-24 VITALS
OXYGEN SATURATION: 96 % | HEIGHT: 71 IN | WEIGHT: 202 LBS | HEART RATE: 77 BPM | SYSTOLIC BLOOD PRESSURE: 112 MMHG | RESPIRATION RATE: 15 BRPM | BODY MASS INDEX: 28.28 KG/M2 | DIASTOLIC BLOOD PRESSURE: 58 MMHG

## 2022-03-24 DIAGNOSIS — Z82.49 FAMILY HISTORY OF ISCHEMIC HEART DISEASE AND OTHER DISEASES OF THE CIRCULATORY SYSTEM: ICD-10-CM

## 2022-03-24 PROCEDURE — 99024 POSTOP FOLLOW-UP VISIT: CPT

## 2022-03-24 PROCEDURE — 93000 ELECTROCARDIOGRAM COMPLETE: CPT

## 2022-03-24 PROCEDURE — 99214 OFFICE O/P EST MOD 30 MIN: CPT

## 2022-03-24 NOTE — ASSESSMENT
[FreeTextEntry1] : ECG: SR, IVCD, leftward axis,  NSST \par \par  HDL 53  TG 77 (2/2022) \par A1C 6 \par \par \par LHC 3/2022:\par 1. \par \par ECHO 2/2022:\par 1. Borderline LVE, EF 45-50%\par 2. Hypo basal inferior/inferoseptal/inferolateral walls and mid inferior wall\par 3. Normal RV/LA/RA \par 4. Mild TR and PI \par \par PHARM NUCLEAR STRESS TEST 3/2022:\par 1. Moderate to large are of moderate inferior/inferolateral wall ischemia. EF 43%\par 2. ECG negative with Sapna\par 3. Initially exercised 4:39 but developed CP and positive ECG\par 4. Normal BP response \par \par CAROTID 3/2022:\par 1. No stenosis\par 2. No significant plaque\par 3. Antegrade flow in the vertebral arteries bilaterally

## 2022-03-24 NOTE — HISTORY OF PRESENT ILLNESS
[FreeTextEntry1] : 66 year old male patient with a medial history of HTN, HLD, family history of \par CAD with intermittent persistent chest pain and abnormal NST and reduced EF \par 45-50%, now s/p C3L (LIMA-LAD, R radial-Ramus, SVG-OM2), HELLEN clip on 3/15/22 \par with Dr. Murdock. Postop course notable for postop acute blood loss anemia \par requiring PRBC. Patient had nausea POD#4 that resolved after patient had BM \par (CMP, Lipase, Amylase, Lactate all WNL). Patient remains hemodynamically stable \par and stable from a respiratory standpoint at this time. \par Pt remained hemodynamically stable and discharged home with support of spouse/family, home care services and the [facility] team. Initial visit completed in home\par Of note, patient was being diuresed while inpatient with Torsemide 40mg PO. He \par was discharged on Torsemide 20mg PO once daily for one week for trace b/l \par LE edema\par \par CC "I"m feeling pretty good"

## 2022-03-24 NOTE — PHYSICAL EXAM
[Normal] : clear lung fields, good air entry, no respiratory distress [Soft] : abdomen soft [Non Tender] : non-tender [Moves all extremities] : moves all extremities [Alert and Oriented] : alert and oriented [de-identified] : surgical scar healing, clean/dry [de-identified] : 1+ edema. right radial scar clean/intact.healing

## 2022-03-24 NOTE — PHYSICAL EXAM
[Sclera] : the sclera and conjunctiva were normal [Neck Appearance] : the appearance of the neck was normal [Respiration, Rhythm And Depth] : normal respiratory rhythm and effort [Exaggerated Use Of Accessory Muscles For Inspiration] : no accessory muscle use [Auscultation Breath Sounds / Voice Sounds] : lungs were clear to auscultation bilaterally [Apical Impulse] : the apical impulse was normal [Heart Rate And Rhythm] : heart rate was normal and rhythm regular [Heart Sounds] : normal S1 and S2 [Murmurs] : no murmurs [Chest Visual Inspection Thoracic Asymmetry] : no chest asymmetry [1+] : left 1+ [FreeTextEntry2] : B/L LE without edema, B/L calves soft, NT [Bowel Sounds] : normal bowel sounds [Abdomen Soft] : soft [Abdomen Tenderness] : non-tender [Abnormal Walk] : normal gait [Skin Color & Pigmentation] : normal skin color and pigmentation [Skin Turgor] : normal skin turgor [] : no rash [FreeTextEntry1] : Right LE SVG harvest site without erythema, warmth or drainage. Resolving soft, NT ecchymosis to LE. Right RAG harvest site with edges well approximated, no warmth, drainage or erythema. Mild edema and ecchymosis throughout arm [Oriented To Time, Place, And Person] : oriented to person, place, and time [Impaired Insight] : insight and judgment were intact [Affect] : the affect was normal

## 2022-03-24 NOTE — REASON FOR VISIT
[Follow-Up: _____] : a [unfilled] follow-up visit [FreeTextEntry1] : FOLLOW YOUR HEART- Transitional Care Management Program- [facility]\par \par

## 2022-03-24 NOTE — ASSESSMENT
[FreeTextEntry1] : Pt recovering well at home s/p OHS. Reviewed all medications and dosages with pt understanding. Pt has all medications in home and is taking as prescribed. Pain controlled with current medication regimen. Pt with insomnia advised he should not use Percocet for sleep aide, he may use Tylenol PM or Melatonin with Tylenol as directed for sleep aide, pt with understanding. No further new symptoms, issues or concerns to report at this time.\par

## 2022-03-24 NOTE — HISTORY OF PRESENT ILLNESS
[FreeTextEntry1] : Patient is a 65yo M with HTN, HLD, family history of CAD and aneurysm  here for cardiac follow up.  Started having CP/burning at beginning of pandemic. Treated with PPI. continue to have symptoms and became exertoinal. UNderwent cardiac work up and abnormal stress test led to cath showing severe multivessel CAD. He underwent CABG at [facility] with Dr Murdock on 3/15/22. He had LIMA to LAD, right radial to Ramus, SVG to OM2 and HELLEN clip. Doing some computer work at home and trying to walk a bit since being home. Has been napping in afternoons as well. No CP/SOB. 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 \par \par ROS: GI negative, all others negative

## 2022-03-24 NOTE — DISCUSSION/SUMMARY
[FreeTextEntry1] : Patient is a 65yo M with HTN, HLD, family history of CAD here for follow up after being found to have severe multivessl CAD and now s/p CABG and HELLEN clip (3/15/22. LIMA-LAD, Right radial to Ramus, SVG to OM2) \par -Post op course mostly uncomplicated other than nausea\par -Recovering quite well\par -BP up, needs to be on ACEI as well and will start\par -Stable at this time\par \par 1. Continue ASA and high dose statin\par 2. Add Lisinopril 10mg daily, check BMP/Mg in a couple weeks\par 3. Follow up Dr Murdock next week\par 4. Echo and EST in 2 months and will discuss cardiac rehab further then\par

## 2022-03-31 ENCOUNTER — APPOINTMENT (OUTPATIENT)
Dept: CARDIOTHORACIC SURGERY | Facility: CLINIC | Age: 67
End: 2022-03-31
Payer: COMMERCIAL

## 2022-03-31 ENCOUNTER — RESULT REVIEW (OUTPATIENT)
Age: 67
End: 2022-03-31

## 2022-03-31 ENCOUNTER — OUTPATIENT (OUTPATIENT)
Dept: OUTPATIENT SERVICES | Facility: HOSPITAL | Age: 67
LOS: 1 days | End: 2022-03-31
Payer: COMMERCIAL

## 2022-03-31 VITALS
SYSTOLIC BLOOD PRESSURE: 113 MMHG | RESPIRATION RATE: 16 BRPM | DIASTOLIC BLOOD PRESSURE: 69 MMHG | HEART RATE: 75 BPM | HEIGHT: 71 IN | TEMPERATURE: 98.24 F | OXYGEN SATURATION: 98 %

## 2022-03-31 VITALS — WEIGHT: 202 LBS | BODY MASS INDEX: 28.17 KG/M2

## 2022-03-31 DIAGNOSIS — J90 PLEURAL EFFUSION, NOT ELSEWHERE CLASSIFIED: ICD-10-CM

## 2022-03-31 DIAGNOSIS — Z98.890 OTHER SPECIFIED POSTPROCEDURAL STATES: Chronic | ICD-10-CM

## 2022-03-31 DIAGNOSIS — Z90.89 ACQUIRED ABSENCE OF OTHER ORGANS: Chronic | ICD-10-CM

## 2022-03-31 PROCEDURE — 99024 POSTOP FOLLOW-UP VISIT: CPT

## 2022-03-31 PROCEDURE — 71046 X-RAY EXAM CHEST 2 VIEWS: CPT

## 2022-03-31 PROCEDURE — 71046 X-RAY EXAM CHEST 2 VIEWS: CPT | Mod: 26

## 2022-03-31 RX ORDER — TORSEMIDE 20 MG/1
20 TABLET ORAL
Refills: 0 | Status: COMPLETED | COMMUNITY
End: 2022-03-31

## 2022-03-31 RX ORDER — POTASSIUM CHLORIDE 1500 MG/1
20 TABLET, FILM COATED, EXTENDED RELEASE ORAL
Refills: 0 | Status: COMPLETED | COMMUNITY
End: 2022-03-31

## 2022-04-18 ENCOUNTER — TRANSCRIPTION ENCOUNTER (OUTPATIENT)
Age: 67
End: 2022-04-18

## 2022-05-05 ENCOUNTER — APPOINTMENT (OUTPATIENT)
Dept: CARDIOLOGY | Facility: CLINIC | Age: 67
End: 2022-05-05

## 2022-05-11 ENCOUNTER — APPOINTMENT (OUTPATIENT)
Dept: CARDIOLOGY | Facility: CLINIC | Age: 67
End: 2022-05-11
Payer: COMMERCIAL

## 2022-05-11 PROCEDURE — 93015 CV STRESS TEST SUPVJ I&R: CPT

## 2022-05-13 ENCOUNTER — APPOINTMENT (OUTPATIENT)
Dept: CARDIOLOGY | Facility: CLINIC | Age: 67
End: 2022-05-13
Payer: COMMERCIAL

## 2022-05-13 PROCEDURE — 93306 TTE W/DOPPLER COMPLETE: CPT

## 2022-05-13 PROCEDURE — 93978 VASCULAR STUDY: CPT

## 2022-05-20 ENCOUNTER — APPOINTMENT (OUTPATIENT)
Dept: CARDIOLOGY | Facility: CLINIC | Age: 67
End: 2022-05-20
Payer: COMMERCIAL

## 2022-05-20 VITALS
SYSTOLIC BLOOD PRESSURE: 144 MMHG | HEIGHT: 71 IN | HEART RATE: 64 BPM | RESPIRATION RATE: 12 BRPM | BODY MASS INDEX: 28.28 KG/M2 | WEIGHT: 202 LBS | DIASTOLIC BLOOD PRESSURE: 70 MMHG

## 2022-05-20 PROCEDURE — 93000 ELECTROCARDIOGRAM COMPLETE: CPT

## 2022-05-20 PROCEDURE — 99214 OFFICE O/P EST MOD 30 MIN: CPT

## 2022-05-20 NOTE — HISTORY OF PRESENT ILLNESS
[FreeTextEntry1] : 67yo M with HTN, HLD, Underwent cardiac work up and abnormal stress test led to cath showing severe multivessel CAD. He underwent CABG at [facility] with Dr Murdock on 3/15/22. He had LIMA to LAD, right radial to Ramus, SVG to OM2 and HELLEN clip. Here for follow up\par Patient denies chest pain, no palpitations, no FRANK, no PND, no orthopnea, no leg edema,  no claudication, no syncope.\par \par works in construction, office work\par walking unlimitted

## 2022-05-20 NOTE — CARDIOLOGY SUMMARY
[de-identified] : 05/11/22: equivocal exercise ST due to baseline abnormalities  [de-identified] : 3/14/22; Normal LVEF, Segmental WMA RCA territory

## 2022-05-20 NOTE — ASSESSMENT
[FreeTextEntry1] : 67yo M with HTN, HLD, Underwent cardiac work up and abnormal stress test led to cath showing severe multivessel CAD. He underwent CABG at [facility] with Dr Murdock on 3/15/22. He had LIMA to LAD, right radial to Ramus, SVG to OM2 and HELLEN clip. Here for follow up\par Patient denies chest pain, no palpitations, no FRANK, no PND, no orthopnea, no leg edema,  no claudication, no syncope.\par \par works in construction, office work\par walking unlimitted \par \par \par #CAD/ CABG \par asymptomatic \par continue asa and statin \par Recent stress test is equivocal\par \par \par #HTN/HLD\par BP slightly  up, continue with current medications \par Patient instructed to monitor BP at home and bring log to f/u.\par Will obtain lipid profile \par Diet and lifestyle modification discussed including low sodium, low fat and low carbohydrate weight reducing diet. Patient is to implement aerobic exercise regimen few days per week.\par \par RTC in 2 months  with results. \par  \par

## 2022-06-22 RX ORDER — KIT FOR THE PREPARATION OF TECHNETIUM TC99M SESTAMIBI 1 MG/5ML
INJECTION, POWDER, LYOPHILIZED, FOR SOLUTION PARENTERAL
Refills: 0 | Status: COMPLETED | OUTPATIENT
Start: 2022-06-22

## 2022-06-22 RX ADMIN — KIT FOR THE PREPARATION OF TECHNETIUM TC99M SESTAMIBI 0: 1 INJECTION, POWDER, LYOPHILIZED, FOR SOLUTION PARENTERAL at 00:00

## 2022-08-03 ENCOUNTER — NON-APPOINTMENT (OUTPATIENT)
Age: 67
End: 2022-08-03

## 2022-08-03 ENCOUNTER — APPOINTMENT (OUTPATIENT)
Dept: CARDIOLOGY | Facility: CLINIC | Age: 67
End: 2022-08-03

## 2022-08-03 VITALS
RESPIRATION RATE: 14 BRPM | SYSTOLIC BLOOD PRESSURE: 130 MMHG | WEIGHT: 205 LBS | DIASTOLIC BLOOD PRESSURE: 76 MMHG | HEIGHT: 71 IN | BODY MASS INDEX: 28.7 KG/M2 | HEART RATE: 59 BPM

## 2022-08-03 DIAGNOSIS — R07.89 OTHER CHEST PAIN: ICD-10-CM

## 2022-08-03 DIAGNOSIS — R94.39 ABNORMAL RESULT OF OTHER CARDIOVASCULAR FUNCTION STUDY: ICD-10-CM

## 2022-08-03 PROCEDURE — 99214 OFFICE O/P EST MOD 30 MIN: CPT | Mod: 25

## 2022-08-03 PROCEDURE — 93000 ELECTROCARDIOGRAM COMPLETE: CPT

## 2022-08-03 RX ORDER — SENNOSIDES 8.6 MG/1
8.6 TABLET ORAL
Refills: 0 | Status: DISCONTINUED | COMMUNITY
End: 2022-08-03

## 2022-08-03 RX ORDER — OXYCODONE HYDROCHLORIDE AND ACETAMINOPHEN 5; 325 MG/1; MG/1
5-325 TABLET ORAL
Refills: 0 | Status: DISCONTINUED | COMMUNITY
End: 2022-08-03

## 2022-08-03 NOTE — DISCUSSION/SUMMARY
[FreeTextEntry1] : Patient is a 68yo M with HTN, HLD, family history CAD here for follow up severe multivessel CAD and now s/p CABG and HELLEN clip (3/15/22. LIMA-LAD, Right radial to Ramus, SVG to OM2) \par -Post op course mostly uncomplicated other than nausea\par -Recovering quite well\par -Echo 5/2022 with normal LV function and evidence prior inferior/inferoseptal infarct\par -Decent functional capacity post op EST 5/2022 (7 METS)\par -ECG with SB, new 1st degree AV delay, unchanged IVCD\par \par 1. Continue ASA and high dose statin, will recheck lipids (got done today and will follow up results)\par 2. Continue current antihypertensives, blood pressure is well controlled \par 3. Recommend aggressive diet and lifestyle modifications \par 4. Continue to increase activity as tolerated, walks a lot daily\par 5. Holter to evaluate new 1st degree AV delay\par 6. Follow up 5-6 months as long as holter unremarkable \par

## 2022-08-03 NOTE — PHYSICAL EXAM
[Normal] : clear lung fields, good air entry, no respiratory distress [Soft] : abdomen soft [Non Tender] : non-tender [Moves all extremities] : moves all extremities [Alert and Oriented] : alert and oriented [de-identified] : surgical scar healing, clean/dry [de-identified] : 1+ edema. right radial scar clean/intact.healing

## 2022-08-03 NOTE — HISTORY OF PRESENT ILLNESS
[FreeTextEntry1] : Patient is a 67yo M with HTN, HLD, family history of CAD and aneurysm  here for cardiac follow up.  Started having CP/burning at beginning of pandemic. Treated with PPI. continue to have symptoms and became exertional. UNderwent cardiac work up and abnormal stress test led to cath showing severe multivessel CAD. He underwent CABG at [facility] with Dr Murdock on 3/15/22. He had LIMA to LAD, right radial to Ramus, SVG to OM2 and HELLEN clip. \par \par  OVerall feeling well. NO exertional CP/SOB. Some discomfort at chest tube site. NO recurrent symptoms like he had prior to surgery. A bit tired at end of day. 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 .  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 \par \par ROS: GI and  negative

## 2022-08-03 NOTE — ASSESSMENT
[FreeTextEntry1] : ECG: SB, 1st degree AV delay, IVCD\par \par  HDL 53  TG 77 (2/2022) \par A1C 6 \par \par \par ECHO 5/2022:\par 1. Normal LV size and function, EF 55-60%\par 2. Hypo mid-basal inferior, basal inferoseptum\par 3. Normal RV/LA/RA \par 4. Mild TR\par \par EST 5/2022:\par 1. Equivocal due to baseline ST abnormalities\par 2. Achieved 6minutes, 7 METS\par 3. Normal BP response\par \par \par ECHO 2/2022:\par 1. Borderline LVE, EF 45-50%\par 2. Hypo basal inferior/inferoseptal/inferolateral walls and mid inferior wall\par 3. Normal RV/LA/RA \par 4. Mild TR and PI \par \par PHARM NUCLEAR STRESS TEST 3/2022:\par 1. Moderate to large are of moderate inferior/inferolateral wall ischemia. EF 43%\par 2. ECG negative with Sapna\par 3. Initially exercised 4:39 but developed CP and positive ECG\par 4. Normal BP response \par \par CAROTID 3/2022:\par 1. No stenosis\par 2. No significant plaque\par 3. Antegrade flow in the vertebral arteries bilaterally

## 2022-08-04 LAB
ANION GAP SERPL CALC-SCNC: 9 MMOL/L
BUN SERPL-MCNC: 20 MG/DL
CALCIUM SERPL-MCNC: 9.1 MG/DL
CHLORIDE SERPL-SCNC: 109 MMOL/L
CO2 SERPL-SCNC: 26 MMOL/L
CREAT SERPL-MCNC: 1.06 MG/DL
EGFR: 77 ML/MIN/1.73M2
GLUCOSE SERPL-MCNC: 106 MG/DL
MAGNESIUM SERPL-MCNC: 2.1 MG/DL
POTASSIUM SERPL-SCNC: 4.4 MMOL/L
SODIUM SERPL-SCNC: 143 MMOL/L

## 2022-08-10 LAB
ANION GAP SERPL CALC-SCNC: 9 MMOL/L
BUN SERPL-MCNC: 20 MG/DL
CALCIUM SERPL-MCNC: 8.9 MG/DL
CHLORIDE SERPL-SCNC: 109 MMOL/L
CHOLEST SERPL-MCNC: 158 MG/DL
CO2 SERPL-SCNC: 26 MMOL/L
CREAT SERPL-MCNC: 1.04 MG/DL
EGFR: 79 ML/MIN/1.73M2
GLUCOSE SERPL-MCNC: 103 MG/DL
HDLC SERPL-MCNC: 52 MG/DL
LDLC SERPL CALC-MCNC: 95 MG/DL
NONHDLC SERPL-MCNC: 106 MG/DL
POTASSIUM SERPL-SCNC: 4.4 MMOL/L
SODIUM SERPL-SCNC: 143 MMOL/L
TRIGL SERPL-MCNC: 59 MG/DL

## 2022-12-07 RX ORDER — ASPIRIN ENTERIC COATED TABLETS 81 MG 81 MG/1
81 TABLET, DELAYED RELEASE ORAL
Qty: 90 | Refills: 1 | Status: ACTIVE | COMMUNITY

## 2022-12-27 ENCOUNTER — APPOINTMENT (OUTPATIENT)
Dept: CARDIOLOGY | Facility: CLINIC | Age: 67
End: 2022-12-27

## 2022-12-27 ENCOUNTER — NON-APPOINTMENT (OUTPATIENT)
Age: 67
End: 2022-12-27

## 2022-12-27 VITALS — DIASTOLIC BLOOD PRESSURE: 70 MMHG | SYSTOLIC BLOOD PRESSURE: 130 MMHG

## 2022-12-27 VITALS
RESPIRATION RATE: 16 BRPM | WEIGHT: 205 LBS | OXYGEN SATURATION: 97 % | HEART RATE: 69 BPM | DIASTOLIC BLOOD PRESSURE: 78 MMHG | SYSTOLIC BLOOD PRESSURE: 148 MMHG | HEIGHT: 71 IN | BODY MASS INDEX: 28.7 KG/M2

## 2022-12-27 DIAGNOSIS — E78.00 PURE HYPERCHOLESTEROLEMIA, UNSPECIFIED: ICD-10-CM

## 2022-12-27 PROCEDURE — 99214 OFFICE O/P EST MOD 30 MIN: CPT | Mod: 25

## 2022-12-27 PROCEDURE — 93000 ELECTROCARDIOGRAM COMPLETE: CPT

## 2022-12-27 RX ORDER — BENZONATATE 200 MG/1
200 CAPSULE ORAL
Qty: 21 | Refills: 0 | Status: DISCONTINUED | COMMUNITY
Start: 2022-12-12 | End: 2022-12-27

## 2022-12-27 RX ORDER — COVID-19 ANTIGEN TEST
KIT MISCELLANEOUS
Qty: 8 | Refills: 0 | Status: DISCONTINUED | COMMUNITY
Start: 2022-12-09 | End: 2022-12-27

## 2022-12-27 NOTE — ASSESSMENT
[FreeTextEntry1] : ECG: SR, leftward axis, diffuse NSST, low voltage\par \par  HDL 52 LDL 95 TG 59 (8/2022) \par  HDL 53  TG 77 (2/2022) \par A1C 6 \par \par \par HOLTER 8/2022:\par 1. SR with 1st degree AV delay\par 2. Multifocal PVCs, 50/hour\par \par ECHO 5/2022:\par 1. Normal LV size and function, EF 55-60%\par 2. Hypo mid-basal inferior, basal inferoseptum\par 3. Normal RV/LA/RA \par 4. Mild TR\par \par EST 5/2022:\par 1. Equivocal due to baseline ST abnormalities\par 2. Achieved 6minutes, 7 METS\par 3. Normal BP response\par \par \par ECHO 2/2022:\par 1. Borderline LVE, EF 45-50%\par 2. Hypo basal inferior/inferoseptal/inferolateral walls and mid inferior wall\par 3. Normal RV/LA/RA \par 4. Mild TR and PI \par \par PHARM NUCLEAR STRESS TEST 3/2022:\par 1. Moderate to large are of moderate inferior/inferolateral wall ischemia. EF 43%\par 2. ECG negative with Sapna\par 3. Initially exercised 4:39 but developed CP and positive ECG\par 4. Normal BP response \par \par CAROTID 3/2022:\par 1. No stenosis\par 2. No significant plaque\par 3. Antegrade flow in the vertebral arteries bilaterally

## 2022-12-27 NOTE — PHYSICAL EXAM
[Normal] : clear lung fields, good air entry, no respiratory distress [Soft] : abdomen soft [Non Tender] : non-tender [Moves all extremities] : moves all extremities [Alert and Oriented] : alert and oriented [de-identified] : surgical scar healing, clean/dry [de-identified] : 1+ edema. right radial scar clean/intact.healing

## 2022-12-27 NOTE — DISCUSSION/SUMMARY
[FreeTextEntry1] : Patient is a 68yo M with HTN, HLD, family history CAD here for follow up severe multivessel CAD and now s/p CABG and HELELN clip (3/15/22. LIMA-LAD, Right radial to Ramus, SVG to OM2) \par -Post op course mostly uncomplicated other than nausea\par -Recovering quite well\par -Echo 5/2022 with normal LV function and evidence prior inferior/inferoseptal infarct\par -Decent functional capacity post op EST 5/2022 (7 METS)\par -ECG no longer with 1st degree AV delay, diffuse NSST otherwise\par \par 1. Continue ASA and high dose statin, LDL mildly above goal. continue diet/lifestyle modifications\par 2. Increase Lisinopril to 20mg daily\par 3. Recommend aggressive diet and lifestyle modifications \par 4. Continue to increase activity as tolerated, walks a lot daily\par 5. Follow up 3-4 months\par 6. Remains on amlodipine, started to maintain radial artery patency and could be discontinued but given remains hypertensive will continue (has been greater than 3 months post op now)\par   [EKG obtained to assist in diagnosis and management of assessed problem(s)] : EKG obtained to assist in diagnosis and management of assessed problem(s)

## 2022-12-27 NOTE — HISTORY OF PRESENT ILLNESS
[FreeTextEntry1] : Patient is a 66yo M with HTN, HLD, family history of CAD and aneurysm  here for cardiac follow up.  Started having CP/burning at beginning of pandemic. Treated with PPI. continue to have symptoms and became exertional. UNderwent cardiac work up and abnormal stress test led to cath showing severe multivessel CAD. He underwent CABG at [facility] with Dr Murdock on 3/15/22. He had LIMA to LAD, right radial to Ramus, SVG to OM2 and HELLEN clip. \par \par  OVerall feeling well. NO exertional CP/SOB. NO recurrent symptoms like he had prior to surgery.  Patient denies PND/orthopnea/edema/palpitations/syncope/claudication. Minimal numbness near surgical site and rib pain, otherwise feels well. 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 .  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 \par \par ROS: GI and  negative

## 2023-03-22 ENCOUNTER — APPOINTMENT (OUTPATIENT)
Dept: CARDIOLOGY | Facility: CLINIC | Age: 68
End: 2023-03-22
Payer: COMMERCIAL

## 2023-03-22 ENCOUNTER — NON-APPOINTMENT (OUTPATIENT)
Age: 68
End: 2023-03-22

## 2023-03-22 VITALS
HEIGHT: 71 IN | HEART RATE: 67 BPM | BODY MASS INDEX: 28 KG/M2 | WEIGHT: 200 LBS | DIASTOLIC BLOOD PRESSURE: 74 MMHG | RESPIRATION RATE: 16 BRPM | OXYGEN SATURATION: 97 % | SYSTOLIC BLOOD PRESSURE: 128 MMHG

## 2023-03-22 PROCEDURE — 99214 OFFICE O/P EST MOD 30 MIN: CPT | Mod: 25

## 2023-03-22 PROCEDURE — 93000 ELECTROCARDIOGRAM COMPLETE: CPT

## 2023-03-22 RX ORDER — LISINOPRIL 20 MG/1
20 TABLET ORAL DAILY
Qty: 90 | Refills: 3 | Status: DISCONTINUED | COMMUNITY
Start: 2022-03-24 | End: 2023-03-22

## 2023-03-22 NOTE — HISTORY OF PRESENT ILLNESS
[FreeTextEntry1] : Patient is a 68yo M with HTN, HLD, family history of CAD and aneurysm  here for cardiac follow up.  Started having CP/burning at beginning of pandemic. Treated with PPI. continue to have symptoms and became exertional. UNderwent cardiac work up and abnormal stress test led to cath showing severe multivessel CAD. He underwent CABG at [facility] with Dr Murdock on 3/15/22. He had LIMA to LAD, right radial to Ramus, SVG to OM2 and HELLEN clip. \par \par  OVerall feeling well. NO exertional CP/SOB. NO recurrent symptoms like he had prior to surgery.  Patient denies PND/orthopnea/edema/palpitations/syncope/claudication. Minimal numbness near surgical site and rib pain, otherwise feels well. Lisinopril increased last OV for HTN\par \par Works in construction and back to work now. Walks daily (frequently up to 20,000).   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 Also runs a sports league and coaches. \par \par ROS: GI and  negative

## 2023-03-22 NOTE — PHYSICAL EXAM
[Normal] : clear lung fields, good air entry, no respiratory distress [Soft] : abdomen soft [Non Tender] : non-tender [Moves all extremities] : moves all extremities [Alert and Oriented] : alert and oriented [de-identified] : surgical scar healing, clean/dry [de-identified] : 1+ edema. right radial scar clean/intact.healing

## 2023-03-22 NOTE — ASSESSMENT
[FreeTextEntry1] : ECG: SR, leftward axis, diffuse NSST, borderline IVCD \par \par  HDL 52 LDL 95 TG 59 (8/2022) \par  HDL 53  TG 77 (2/2022) \par A1C 6 \par \par \par HOLTER 8/2022:\par 1. SR with 1st degree AV delay\par 2. Multifocal PVCs, 50/hour\par \par ECHO 5/2022:\par 1. Normal LV size and function, EF 55-60%\par 2. Hypo mid-basal inferior, basal inferoseptum\par 3. Normal RV/LA/RA \par 4. Mild TR\par \par EST 5/2022:\par 1. Equivocal due to baseline ST abnormalities\par 2. Achieved 6minutes, 7 METS\par 3. Normal BP response\par \par \par ECHO 2/2022:\par 1. Borderline LVE, EF 45-50%\par 2. Hypo basal inferior/inferoseptal/inferolateral walls and mid inferior wall\par 3. Normal RV/LA/RA \par 4. Mild TR and PI \par \par PHARM NUCLEAR STRESS TEST 3/2022:\par 1. Moderate to large are of moderate inferior/inferolateral wall ischemia. EF 43%\par 2. ECG negative with Sapna\par 3. Initially exercised 4:39 but developed CP and positive ECG\par 4. Normal BP response \par \par CAROTID 3/2022:\par 1. No stenosis\par 2. No significant plaque\par 3. Antegrade flow in the vertebral arteries bilaterally

## 2023-03-22 NOTE — DISCUSSION/SUMMARY
[EKG obtained to assist in diagnosis and management of assessed problem(s)] : EKG obtained to assist in diagnosis and management of assessed problem(s) [FreeTextEntry1] : Patient is a 66yo M with HTN, HLD, family history CAD here for follow up severe multivessel CAD and s/p CABG and HELLEN clip (3/15/22. LIMA-LAD, Right radial to Ramus, SVG to OM2) \par -Post op course mostly uncomplicated other than nausea\par -Recovered quite well\par -Echo 5/2022 with normal LV function and evidence prior inferior/inferoseptal infarct\par -Decent functional capacity post op EST 5/2022 (7 METS)\par -ECG with intermittent 1st degree AV delay, diffuse NSST \par \par 1. Continue ASA and high dose statin, need to recheck lipids \par 2.Recommend aggressive diet and lifestyle modifications \par 3. Continue to increase activity as tolerated, walks a lot daily\par 4. Remains on amlodipine, started to maintain radial artery patency and could be discontinued but given remains hypertensive will continue (has been greater than 3 months post op now)\par 5. TRial off lisinopril due to cough, component post nasal drip but maybe from ACEI. Will stop 1 week then get on Valsartan 160mg daily

## 2023-04-03 NOTE — H&P PST ADULT - HISTORY OF PRESENT ILLNESS
Narrative: 66 year old male with PMH of HTN, HLD, faqmily history of CAD with intermittent persistent chest pain and abnormal NST and reduced EF 45-50% now here for [facility].    Symptoms:        Angina (Class): CCS2       Ischemic Symptoms: Chest pain     Heart Failure: New        Systolic/Diastolic/Combined: systolic        NYHA Class (within 2 weeks):     Assessment of LVEF (Must be within 6 months):        EF: 45-50%        Assessed by: TTE        Date: 2/18/2022    Prior Cardiac Interventions (LHC, stents, CABG): NO      Noninvasive Testing:   Stress Test: Date: 3/7/2022       Protocol: Regadenoson        Duration of Exercise: 4 minutes and 39 seconds        Symptoms: atypical chest pain/nausea        EKG Changes: frequent PVC's        DTS: -3       Myocardial Imaging: moderate to large sized area of moderate inferior/inferolateral wall ischemia        Risk Assessment (Low, Medium, High): medium     Echo (Date, Findings): 2/18/2022 EF 45-50%, Basal inferiro segment, basal inferoseptal segment, basal inferolateral segment, and mid inferiro segment are moderately hypokinetic, mild ND/TR     Antianginal Therapies:        Beta Blockers:         Calcium Channel Blockers:        Long Acting Nitrates:        Ranexa:     Associated Risk Factors:        Cerebrovascular Disease: N/A       Chronic Lung Disease: N/A       Peripheral Arterial Disease: N/A       Chronic Kidney Disease (if yes, what is GFR): N/A       Uncontrolled Diabetes (if yes, what is HgbA1C or FBS): N/A       Poorly Controlled Hypertension (if yes, what is SBP): N/A       Morbid Obesity (if yes, what is BMI): N/A       History of Recent Ventricular Arrhythmia: N/A       Inability to Ambulate Safely: N/A       Need for Therapeutic Anticoagulation: N/A       Antiplatelet or Contrast Allergy: N/A    Social History:        Marital:         Tobacco:        ETOH:        Caffeine:     ROS:  CONSTITUTIONAL: No weakness, fevers or chills  EYES/ENT: No visual changes;  No vertigo or throat pain   NECK: No pain or stiffness  RESPIRATORY: No cough, wheezing, hemoptysis; No shortness of breath  CARDIOVASCULAR: No chest pain or palpitations  GASTROINTESTINAL: No abdominal or epigastric pain. No nausea, vomiting, or hematemesis; No diarrhea or constipation. No melena or hematochezia.  GENITOURINARY: No dysuria, frequency or hematuria  NEUROLOGICAL: No numbness or weakness  SKIN: No itching, burning, rashes, or lesions   All other review of systems is negative unless indicated above    PHYSICAL EXAM:    Vital Signs Last 24 Hrs  T(C): --  T(F): --  HR: --  BP: --  BP(mean): --  RR: --  SpO2: --    GENERAL: Pt lying comfortably, NAD.  ENMT: PERRL, +EOMI.  NECK: soft, Supple, No JVD,   CHEST/LUNG: Clear to auscultatation bilaterally; No wheezing.  HEART: S1S2+, Regular rate and rhythm; No murmurs.  ABDOMEN: Soft, Nontender, Nondistended; Bowel sounds present.  MUSCULOSKELETAL: Normal range of motion.  SKIN: No rashes or lesions.  NEURO: AAOX3, no focal deficits, no motor r sensory loss.  PSYCH: normal mood.  VASCULAR:   Radial +2 R/+2 L  Femoral +2 R/+2 L  PT +2 R/+2 L  DP +2 R/+2 L    EKG:    Narrative: 66 year old male with PMH of HTN, HLD, faqmily history of CAD with intermittent persistent chest pain and abnormal NST and reduced EF 45-50% now here for [facility].    Symptoms:        Angina (Class): CCS2       Ischemic Symptoms: Chest pain     Heart Failure: New        Systolic/Diastolic/Combined: systolic        NYHA Class (within 2 weeks): II    Assessment of LVEF (Must be within 6 months):        EF: 45-50%        Assessed by: TTE        Date: 2/18/2022    Prior Cardiac Interventions (LHC, stents, CABG): NO      Noninvasive Testing:   Stress Test: Date: 3/7/2022       Protocol: Regadenoson        Duration of Exercise: 4 minutes and 39 seconds        Symptoms: atypical chest pain/nausea        EKG Changes: frequent PVC's        DTS: -3       Myocardial Imaging: moderate to large sized area of moderate inferior/inferolateral wall ischemia        Risk Assessment (Low, Medium, High): medium     Echo (Date, Findings): 2/18/2022 EF 45-50%, Basal inferiro segment, basal inferoseptal segment, basal inferolateral segment, and mid inferiro segment are moderately hypokinetic, mild SD/TR     Antianginal Therapies:        Beta Blockers:         Calcium Channel Blockers:        Long Acting Nitrates:        Ranexa:     Associated Risk Factors:        Cerebrovascular Disease: N/A       Chronic Lung Disease: N/A       Peripheral Arterial Disease: N/A       Chronic Kidney Disease (if yes, what is GFR): N/A       Uncontrolled Diabetes (if yes, what is HgbA1C or FBS): N/A       Poorly Controlled Hypertension (if yes, what is SBP): N/A       Morbid Obesity (if yes, what is BMI): N/A       History of Recent Ventricular Arrhythmia: N/A       Inability to Ambulate Safely: N/A       Need for Therapeutic Anticoagulation: N/A       Antiplatelet or Contrast Allergy: N/A    Social History:        Marital:  ; employed in home construction       Tobacco: Never       ETOH: 1-3 beers 3x/week       Caffeine: 1-3 cups coffee daily    ROS:  CONSTITUTIONAL: No weakness, fevers or chills  EYES/ENT: No visual changes;  No vertigo or throat pain   NECK: No pain or stiffness  RESPIRATORY: No cough, wheezing, hemoptysis; No shortness of breath  CARDIOVASCULAR: No chest pain or palpitations  GASTROINTESTINAL: Epigastric burning and reflux; No abdominal or epigastric pain. No nausea, vomiting, or hematemesis; No diarrhea or constipation. No melena or hematochezia.  GENITOURINARY: No dysuria, frequency or hematuria  NEUROLOGICAL: No numbness or weakness  SKIN: No itching, burning, rashes, or lesions   All other review of systems is negative unless indicated above    PHYSICAL EXAM:    Vital Signs Last 24 Hrs  T(F): 98.6  HR: 57  BP: 131/63  RR: 20  SpO2: 98%    GENERAL: Pt lying comfortably, NAD.  ENMT: PERRL, +EOMI.  NECK: soft, Supple, No JVD,   CHEST/LUNG: Clear to auscultation bilaterally; No wheezing.  HEART: S1S2+, Regular rate and rhythm; No murmurs.  ABDOMEN: Soft, Nontender, Nondistended; Bowel sounds present.  MUSCULOSKELETAL: Normal range of motion.  SKIN: No rashes or lesions.  NEURO: AAOX3, no focal deficits, no motor r sensory loss.  PSYCH: normal mood.  VASCULAR:   Radial +2 R/+2 L  Femoral +2 R/+2 L  PT +2 R/+2 L  DP +2 R/+2 L    EKG: SB with PVC   Normal rate, regular rhythm.  Heart sounds S1, S2.  No murmurs, rubs or gallops.

## 2023-09-01 ENCOUNTER — RX RENEWAL (OUTPATIENT)
Age: 68
End: 2023-09-01

## 2023-09-08 ENCOUNTER — APPOINTMENT (OUTPATIENT)
Dept: CARDIOLOGY | Facility: CLINIC | Age: 68
End: 2023-09-08
Payer: COMMERCIAL

## 2023-09-08 ENCOUNTER — NON-APPOINTMENT (OUTPATIENT)
Age: 68
End: 2023-09-08

## 2023-09-08 VITALS
RESPIRATION RATE: 16 BRPM | HEART RATE: 62 BPM | WEIGHT: 200 LBS | OXYGEN SATURATION: 98 % | SYSTOLIC BLOOD PRESSURE: 120 MMHG | DIASTOLIC BLOOD PRESSURE: 80 MMHG | BODY MASS INDEX: 28 KG/M2 | HEIGHT: 71 IN

## 2023-09-08 DIAGNOSIS — I25.10 ATHEROSCLEROTIC HEART DISEASE OF NATIVE CORONARY ARTERY W/OUT ANGINA PECTORIS: ICD-10-CM

## 2023-09-08 DIAGNOSIS — R94.31 ABNORMAL ELECTROCARDIOGRAM [ECG] [EKG]: ICD-10-CM

## 2023-09-08 PROCEDURE — 99214 OFFICE O/P EST MOD 30 MIN: CPT | Mod: 25

## 2023-09-08 PROCEDURE — 93000 ELECTROCARDIOGRAM COMPLETE: CPT

## 2023-09-08 NOTE — DISCUSSION/SUMMARY
[EKG obtained to assist in diagnosis and management of assessed problem(s)] : EKG obtained to assist in diagnosis and management of assessed problem(s) [FreeTextEntry1] : Patient is a 67yo M with HTN, HLD, family history CAD here for follow up severe multivessel CAD and s/p CABG and HELLEN clip (3/15/22. LIMA-LAD, Right radial to Ramus, SVG to OM2)  -Post op course mostly uncomplicated other than nausea, recovered quite well. Was on amlodipine for radial artery patency, maintained on it since for his HTN -Echo 5/2022 with normal LV function and evidence prior inferior/inferoseptal infarct -Decent functional capacity post op EST 5/2022 (7 METS) -ECG with 1st degree AV delay, diffuse NSST and unchanged today -CV stable at this time   1. Continue ASA and high dose statin/zetia. Check fasting lipids and call with results Will see PMD soon as well for routine BW 2.Recommend aggressive diet and lifestyle modifications  3. Continue to increase activity as tolerated, walks a lot daily 4. Continue current antihypertensives, blood pressure is well controlled  5. Follow up 6 months

## 2023-09-08 NOTE — ASSESSMENT
[FreeTextEntry1] : ECG: SR, 1st degree AV delay, NSST, PVC    HDL 52 LDL 95 TG 59 (8/2022)   HDL 53  TG 77 (2/2022)  A1C 6    HOLTER 8/2022: 1. SR with 1st degree AV delay 2. Multifocal PVCs, 50/hour  ECHO 5/2022: 1. Normal LV size and function, EF 55-60% 2. Hypo mid-basal inferior, basal inferoseptum 3. Normal RV/LA/RA  4. Mild TR  EST 5/2022: 1. Equivocal due to baseline ST abnormalities 2. Achieved 6minutes, 7 METS 3. Normal BP response   ECHO 2/2022: 1. Borderline LVE, EF 45-50% 2. Hypo basal inferior/inferoseptal/inferolateral walls and mid inferior wall 3. Normal RV/LA/RA  4. Mild TR and PI   PHARM NUCLEAR STRESS TEST 3/2022: 1. Moderate to large are of moderate inferior/inferolateral wall ischemia. EF 43% 2. ECG negative with Sapna 3. Initially exercised 4:39 but developed CP and positive ECG 4. Normal BP response   CAROTID 3/2022: 1. No stenosis 2. No significant plaque 3. Antegrade flow in the vertebral arteries bilaterally

## 2023-09-08 NOTE — PHYSICAL EXAM
[Normal] : clear lung fields, good air entry, no respiratory distress [Soft] : abdomen soft [Non Tender] : non-tender [Moves all extremities] : moves all extremities [Alert and Oriented] : alert and oriented [de-identified] : surgical scar healing, clean/dry [de-identified] : 1+ edema. right radial scar clean/intact.healing

## 2023-09-15 ENCOUNTER — RX RENEWAL (OUTPATIENT)
Age: 68
End: 2023-09-15

## 2023-12-30 ENCOUNTER — RX RENEWAL (OUTPATIENT)
Age: 68
End: 2023-12-30

## 2023-12-30 RX ORDER — EZETIMIBE 10 MG/1
10 TABLET ORAL
Qty: 90 | Refills: 1 | Status: ACTIVE | COMMUNITY
Start: 1900-01-01 | End: 1900-01-01

## 2024-01-19 ENCOUNTER — RX RENEWAL (OUTPATIENT)
Age: 69
End: 2024-01-19

## 2024-01-19 RX ORDER — VALSARTAN 160 MG/1
160 TABLET, COATED ORAL DAILY
Qty: 90 | Refills: 2 | Status: ACTIVE | COMMUNITY
Start: 2023-03-22 | End: 1900-01-01

## 2024-01-29 ENCOUNTER — RX RENEWAL (OUTPATIENT)
Age: 69
End: 2024-01-29

## 2024-01-29 RX ORDER — ATORVASTATIN CALCIUM 80 MG/1
80 TABLET, FILM COATED ORAL DAILY
Qty: 90 | Refills: 1 | Status: ACTIVE | COMMUNITY
Start: 1900-01-01 | End: 1900-01-01

## 2024-02-13 ENCOUNTER — RX RENEWAL (OUTPATIENT)
Age: 69
End: 2024-02-13

## 2024-02-13 RX ORDER — METOPROLOL SUCCINATE 50 MG/1
50 TABLET, EXTENDED RELEASE ORAL DAILY
Qty: 90 | Refills: 1 | Status: ACTIVE | COMMUNITY
Start: 2023-09-01 | End: 1900-01-01

## 2024-02-27 ENCOUNTER — NON-APPOINTMENT (OUTPATIENT)
Age: 69
End: 2024-02-27

## 2024-02-27 ENCOUNTER — APPOINTMENT (OUTPATIENT)
Dept: CARDIOLOGY | Facility: CLINIC | Age: 69
End: 2024-02-27
Payer: COMMERCIAL

## 2024-02-27 VITALS
HEART RATE: 64 BPM | WEIGHT: 209 LBS | BODY MASS INDEX: 29.26 KG/M2 | HEIGHT: 71 IN | RESPIRATION RATE: 16 BRPM | DIASTOLIC BLOOD PRESSURE: 80 MMHG | SYSTOLIC BLOOD PRESSURE: 134 MMHG | OXYGEN SATURATION: 97 %

## 2024-02-27 DIAGNOSIS — E78.5 HYPERLIPIDEMIA, UNSPECIFIED: ICD-10-CM

## 2024-02-27 DIAGNOSIS — I44.0 ATRIOVENTRICULAR BLOCK, FIRST DEGREE: ICD-10-CM

## 2024-02-27 DIAGNOSIS — I25.10 ATHEROSCLEROTIC HEART DISEASE OF NATIVE CORONARY ARTERY W/OUT ANGINA PECTORIS: ICD-10-CM

## 2024-02-27 DIAGNOSIS — I49.3 VENTRICULAR PREMATURE DEPOLARIZATION: ICD-10-CM

## 2024-02-27 DIAGNOSIS — I10 ESSENTIAL (PRIMARY) HYPERTENSION: ICD-10-CM

## 2024-02-27 DIAGNOSIS — I25.5 ISCHEMIC CARDIOMYOPATHY: ICD-10-CM

## 2024-02-27 PROCEDURE — G2211 COMPLEX E/M VISIT ADD ON: CPT | Mod: NC,1L

## 2024-02-27 PROCEDURE — 93000 ELECTROCARDIOGRAM COMPLETE: CPT

## 2024-02-27 PROCEDURE — 99214 OFFICE O/P EST MOD 30 MIN: CPT

## 2024-02-27 NOTE — DISCUSSION/SUMMARY
[EKG obtained to assist in diagnosis and management of assessed problem(s)] : EKG obtained to assist in diagnosis and management of assessed problem(s) [FreeTextEntry1] : Patient is a 69yo M with HTN, HLD, family history CAD here for follow up severe multivessel CAD and s/p CABG and HELLEN clip (3/15/22. LIMA-LAD, Right radial to Ramus, SVG to OM2)  -Post op course mostly uncomplicated other than nausea, recovered quite well. Was on amlodipine for radial artery patency, maintained on it since for his HTN -Echo 5/2022 with normal LV function and evidence prior inferior/inferoseptal infarct -Decent functional capacity post op EST 5/2022 (7 METS) -ECG with 1st degree AV delay, diffuse NSST and unchanged today -CV stable at this time , BP borderline, no med changes  1. Continue ASA and high dose statin/zetia. 2.Recommend aggressive diet and lifestyle modifications  3. Continue to increase activity as tolerated, walks a lot daily 4. Continue current antihypertensives, blood pressure is well controlled  5. Seeing PMD next month and will be getting BW then. Also discuss hand symptoms of OA there  6. Follow up 6months, if stable then likely change to yearly follow up

## 2024-02-27 NOTE — ASSESSMENT
[FreeTextEntry1] : ECG: SR, 1st degree AV delay, NSS   HDL 52 LDL 95 TG 59 (8/2022)   HDL 53  TG 77 (2/2022)  A1C 6    HOLTER 8/2022: 1. SR with 1st degree AV delay 2. Multifocal PVCs, 50/hour  ECHO 5/2022: 1. Normal LV size and function, EF 55-60% 2. Hypo mid-basal inferior, basal inferoseptum 3. Normal RV/LA/RA  4. Mild TR  EST 5/2022: 1. Equivocal due to baseline ST abnormalities 2. Achieved 6minutes, 7 METS 3. Normal BP response   ECHO 2/2022: 1. Borderline LVE, EF 45-50% 2. Hypo basal inferior/inferoseptal/inferolateral walls and mid inferior wall 3. Normal RV/LA/RA  4. Mild TR and PI   PHARM NUCLEAR STRESS TEST 3/2022: 1. Moderate to large are of moderate inferior/inferolateral wall ischemia. EF 43% 2. ECG negative with Sapna 3. Initially exercised 4:39 but developed CP and positive ECG 4. Normal BP response   CAROTID 3/2022: 1. No stenosis 2. No significant plaque 3. Antegrade flow in the vertebral arteries bilaterally

## 2024-02-27 NOTE — PHYSICAL EXAM
[Normal] : normal S1, S2, no murmur, no rub, no gallop [Soft] : abdomen soft [Moves all extremities] : moves all extremities [Non Tender] : non-tender [Alert and Oriented] : alert and oriented [de-identified] : surgical scar healing, clean/dry [de-identified] : 1+ edema. right radial scar clean/intact.healing

## 2024-02-27 NOTE — HISTORY OF PRESENT ILLNESS
[FreeTextEntry1] : Patient is a 67yo M with HTN, HLD, family history of CAD and aneurysm  here for cardiac follow up.  Started having CP/burning at beginning of pandemic. Treated with PPI. continue to have symptoms and became exertional. Underwent cardiac work up and abnormal stress test led to cath showing severe multivessel CAD. He underwent CABG at [facility] with Dr Murdock on 3/15/22. He had LIMA to LAD, right radial to Ramus, SVG to OM2 and HELLEN clip.    Overall feeling well. NO exertional CP/SOB. NO recurrent symptoms like he had prior to surgery.  Patient denies PND/orthopnea/edema/palpitations/syncope/claudication. Minimal numbness near surgical site and rib pain but better, otherwise feels well. NO new cardiac symptoms and feeling well . 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 Also runs a sports league and coaches. Travelled to [place] and [place] this summer.   ROS: GI and  negative

## 2024-04-15 ENCOUNTER — RX RENEWAL (OUTPATIENT)
Age: 69
End: 2024-04-15

## 2024-04-15 RX ORDER — AMLODIPINE BESYLATE 2.5 MG/1
2.5 TABLET ORAL
Qty: 90 | Refills: 2 | Status: ACTIVE | COMMUNITY
Start: 2023-09-15 | End: 1900-01-01

## 2024-08-16 ENCOUNTER — RX RENEWAL (OUTPATIENT)
Age: 69
End: 2024-08-16

## 2024-10-16 ENCOUNTER — APPOINTMENT (OUTPATIENT)
Dept: CARDIOLOGY | Facility: CLINIC | Age: 69
End: 2024-10-16
Payer: COMMERCIAL

## 2024-10-16 ENCOUNTER — NON-APPOINTMENT (OUTPATIENT)
Age: 69
End: 2024-10-16

## 2024-10-16 VITALS
SYSTOLIC BLOOD PRESSURE: 120 MMHG | DIASTOLIC BLOOD PRESSURE: 70 MMHG | WEIGHT: 205 LBS | BODY MASS INDEX: 28.7 KG/M2 | HEIGHT: 71 IN | HEART RATE: 79 BPM

## 2024-10-16 DIAGNOSIS — I25.10 ATHEROSCLEROTIC HEART DISEASE OF NATIVE CORONARY ARTERY W/OUT ANGINA PECTORIS: ICD-10-CM

## 2024-10-16 DIAGNOSIS — I25.5 ISCHEMIC CARDIOMYOPATHY: ICD-10-CM

## 2024-10-16 DIAGNOSIS — I10 ESSENTIAL (PRIMARY) HYPERTENSION: ICD-10-CM

## 2024-10-16 DIAGNOSIS — E78.5 HYPERLIPIDEMIA, UNSPECIFIED: ICD-10-CM

## 2024-10-16 DIAGNOSIS — I49.3 VENTRICULAR PREMATURE DEPOLARIZATION: ICD-10-CM

## 2024-10-16 DIAGNOSIS — I44.0 ATRIOVENTRICULAR BLOCK, FIRST DEGREE: ICD-10-CM

## 2024-10-16 PROCEDURE — G2211 COMPLEX E/M VISIT ADD ON: CPT | Mod: NC

## 2024-10-16 PROCEDURE — 93000 ELECTROCARDIOGRAM COMPLETE: CPT

## 2024-10-16 PROCEDURE — 99214 OFFICE O/P EST MOD 30 MIN: CPT

## 2024-11-20 ENCOUNTER — RX RENEWAL (OUTPATIENT)
Age: 69
End: 2024-11-20

## 2025-02-03 ENCOUNTER — RX RENEWAL (OUTPATIENT)
Age: 70
End: 2025-02-03

## 2025-02-04 NOTE — ASSESSMENT
Please kindly generate referral for   Orders Placed This Encounter    MAMMO SCREENING BILATERAL W ANNIE      Please let patient know when ready. Thank you.   [FreeTextEntry1] : ECG: SR, IVCD, PVC, NSST \par \par  HDL 53  TG 77 (2/2022) \par A1C 6

## 2025-05-12 ENCOUNTER — RX RENEWAL (OUTPATIENT)
Age: 70
End: 2025-05-12

## (undated) DEVICE — SUT PROLENE 4-0 30" SH-1

## (undated) DEVICE — DRAPE INSTRUMENT POUCH

## (undated) DEVICE — BLANKET HYPER/HYPO ADULT CT/10

## (undated) DEVICE — DRAIN DRAINAGE SET CHEST DRY ADL

## (undated) DEVICE — ELCTR BOVIE HANDHELD PENCIL ROCKER SWITCH 15FT

## (undated) DEVICE — VESSEL LOOP ASPEN MAXI RED

## (undated) DEVICE — SUT ETHIBOND 1 30" OS6

## (undated) DEVICE — GLV 6.5 ESTEEM BLUE

## (undated) DEVICE — TUBING SUCTION 20FT

## (undated) DEVICE — MEDICATION LABELS W MARKER

## (undated) DEVICE — Device

## (undated) DEVICE — DRAPE CV SPLIT SHEETS 106X135"

## (undated) DEVICE — DRSG WRAP ORTHO LF 6X5"

## (undated) DEVICE — FOLEY TRAY 16FR 5CC LF LUBRISIL ADVANCE TEMP CLOSED

## (undated) DEVICE — SUT PROLENE 5-0 30" RB-2

## (undated) DEVICE — TRAY IRRIGATION SYR BULB 60CC

## (undated) DEVICE — PACK OPEN HEART VAMP PLUS

## (undated) DEVICE — MEDTRONIC CLEARVIEW BLOWER MISTER KIT W TUBING SET

## (undated) DEVICE — SUT ETHIBOND 5 4-30" CCS

## (undated) DEVICE — DRAIN SILICONE MEDIASTINAL 9MM

## (undated) DEVICE — SUT VICRYL 3-0 27" CT-1

## (undated) DEVICE — FRAZIER SUCTION TIP 10FR

## (undated) DEVICE — SUT PROLENE 3-0 36" MH

## (undated) DEVICE — VASCULAR PROBES

## (undated) DEVICE — TUBING TRUWAVE PRESSURE MALE/FEMALE 72"

## (undated) DEVICE — PUN AOR 4MM

## (undated) DEVICE — SUT VICRYL 2 27" TP-1 UNDYED

## (undated) DEVICE — PERF ST MULT CLR CODE 38CM

## (undated) DEVICE — SENSOR MYOCARDIAL TEMP 15MM

## (undated) DEVICE — STAPLER SKIN PROXIMATE

## (undated) DEVICE — GLV 6 PROTEXIS

## (undated) DEVICE — SUT ETHIBOND 4-0 36" RB-1

## (undated) DEVICE — TUBING INSUFFLATION LAP FILTER 10FT

## (undated) DEVICE — KIT SUCT MAXIFLO STRT CATH 14FR

## (undated) DEVICE — SUT SILK 2-0 8-18" SH

## (undated) DEVICE — DRSG KLING 4"

## (undated) DEVICE — GOWN TRIMAX LG

## (undated) DEVICE — SPONGE LAP X-RAY DETECTABLE 18X18"

## (undated) DEVICE — SUT ETHIBOND 3-0 36" RB-1

## (undated) DEVICE — SUT VICRYL 0 36" CTX UNDYED

## (undated) DEVICE — SUT PROLENE 8-0 24" BV175-6

## (undated) DEVICE — SYS VEIN HARVESTING VIRTUOSAPH PLUS W/ RADIAL

## (undated) DEVICE — SOL INJ NS 0.9% 1000ML

## (undated) DEVICE — DRSG MOLNLYCKE MEPILEX BORDER AG 4X4

## (undated) DEVICE — PROBE TEMP MYOCARDIAL 30MM

## (undated) DEVICE — POSITIONER CARDIAC BUMP

## (undated) DEVICE — BLADE STERNUM 31MM X 6.27 X .79

## (undated) DEVICE — GLV 6.5 PROTEXIS

## (undated) DEVICE — BLADE MINI SHARP ALL ROUND

## (undated) DEVICE — CABLE PACE ATRIAL TEMP SCREW DOWN  DISP 12FT

## (undated) DEVICE — SUT PROLENE 7-0 24" BV-1

## (undated) DEVICE — SUT PROLENE 7-0 30" BV-1

## (undated) DEVICE — GOWN XL W TOWEL

## (undated) DEVICE — PRESSURE INFUSOR BAG 1000ML

## (undated) DEVICE — SUT BOOT ASSORTED 5 PAIR PER CARTRIDGE

## (undated) DEVICE — SUT VICRYL 1 36" CTX UNDYED

## (undated) DEVICE — SUT PROLENE 3-0 36" SH

## (undated) DEVICE — GLV 7.5 SENSICARE W ALOE

## (undated) DEVICE — SUT STAINLESS STEEL 5 18" SCC

## (undated) DEVICE — SYR LUER LOK 20CC

## (undated) DEVICE — SUT MONOCRYL 3-0 27" PS-2 UNDYED

## (undated) DEVICE — SUT PROLENE 3-0 36" SH-1

## (undated) DEVICE — RANGER BLOOD/FLUID WARMING SET DISP

## (undated) DEVICE — SOL ANTI FOG

## (undated) DEVICE — CLEANER FOR ELCTR TIP

## (undated) DEVICE — SUT ETHIBOND 2-0 4-30" RB-1 WHITE

## (undated) DEVICE — POSITIONER HEART STABILZR URCHIN EVO

## (undated) DEVICE — SUT VICRYL 0 54" REEL UNDYED

## (undated) DEVICE — SOL INJ NS 0.9% 500ML 1-PORT

## (undated) DEVICE — SUT SILK 5-0 60" TIES

## (undated) DEVICE — SUT BLUNT SZ 5

## (undated) DEVICE — DRSG 4X4

## (undated) DEVICE — PREP SCRUB BRUSH W CHG 4%

## (undated) DEVICE — DRAPE TOWEL BLUE 17" X 24"

## (undated) DEVICE — ELCTR MULTIFUNCTION DEFIBRILLATION ELECTRODE EDGE SYSTEM ADULT

## (undated) DEVICE — CABLE PACE BI-V TEMP ALLIGA  DISP 12FT

## (undated) DEVICE — LIGATING CLIPS AESCULAP LARGE 6

## (undated) DEVICE — BLADE SURGICAL #11 CARBON

## (undated) DEVICE — PUNCH AORTIC LONG 4.4MM

## (undated) DEVICE — SOL IRR POUR H2O 500ML

## (undated) DEVICE — DRAPE TOWEL WHITE 17" X 24"

## (undated) DEVICE — SUCTION YANKAUER NO CONTROL VENT

## (undated) DEVICE — SUT PROLENE 7-0 24" BV175-6

## (undated) DEVICE — PAD PHRENIC NERVE MED

## (undated) DEVICE — GLV 7.5 PROTEXIS

## (undated) DEVICE — DRSG SILICONE FOAM MEPILEX BORDER SACRUM SM

## (undated) DEVICE — SUT ETHIBOND 2-0 36" SH

## (undated) DEVICE — VASOVIEW HARVESTING SYS 7 XB

## (undated) DEVICE — GLV 7 PROTEXIS

## (undated) DEVICE — TUBING IV SET SECOND 34" W/O LOK-BLUNT

## (undated) DEVICE — SUT PROLENE 5-0 36" RB-1

## (undated) DEVICE — DRSG TEGADERM 4X4.75"

## (undated) DEVICE — NDL BLUNT 18G LOOP VESSEL MAXI WHITE

## (undated) DEVICE — CABLE PACE A/V TEMP SCREW DOWN DISP 6FT

## (undated) DEVICE — ADAPTER CARDIOPL VENT Y 19.1CM

## (undated) DEVICE — BLADE SURGICAL #20 CARBON

## (undated) DEVICE — SUT DOUBLE 6 WIRE STERNAL

## (undated) DEVICE — DRSG OPSITE POSTOP 13.75"X4"

## (undated) DEVICE — SUT SILK 0 30" SH

## (undated) DEVICE — TUBING ALARIS PUMP MODULE NON-DEHP

## (undated) DEVICE — ELCTR BOVIE BLADE 3/4" EXTENDED LENGTH 6"

## (undated) DEVICE — DRAIN DRAINGE CHEST DRY ADL DUAL

## (undated) DEVICE — TUBING MEDI-VAC W MAXIGRIP CONNECTORS 1/4"X6'

## (undated) DEVICE — DRAPE IOBAN 23X33"

## (undated) DEVICE — STOPCOCK 3 WAY W TUBE 35"

## (undated) DEVICE — BLADE SURGICAL #15 CARBON

## (undated) DEVICE — DRSG OPSITE POSTOP 2.5"X2"

## (undated) DEVICE — SUT PROLENE 6-0 24" C-1

## (undated) DEVICE — SUT SILK 0 30" KS

## (undated) DEVICE — SUT SILK 2 30" MH

## (undated) DEVICE — SET BLOOD Y-TYPE

## (undated) DEVICE — SUT OCTOBASE HOLDING INSERT

## (undated) DEVICE — TONGUE DEPRESSOR

## (undated) DEVICE — SUT ETHIBOND 2-0 30" SH-1 GREEN/WHITE

## (undated) DEVICE — SUT MONOCRYL 4-0 27" PS-2 UNDYED

## (undated) DEVICE — GLV 8 PROTEXIS

## (undated) DEVICE — TAPE UMBILICAL 1/8 X 30" STRANDS

## (undated) DEVICE — GLV 8.5 PROTEXIS

## (undated) DEVICE — CATH IV SAFE BC YEL 24GAX0.75"

## (undated) DEVICE — NDL PERC BASEPLT 18GX7CM

## (undated) DEVICE — PLEDGET POLYMER 9.5X4.8MM

## (undated) DEVICE — CLIP SOFTJAW DBL 6MM

## (undated) DEVICE — SUT FLEXON 0 24" SC-2 CV-24

## (undated) DEVICE — SUT SILK 0 30" TIES

## (undated) DEVICE — SWITCH PERF ARISS TABLE

## (undated) DEVICE — PUN AOR STD 5MM

## (undated) DEVICE — SUT ETHIBOND 2-0 30" V5 WHITE

## (undated) DEVICE — SUT VICRYL 2-0 27" CT-1 UNDYED

## (undated) DEVICE — LIGATING CLIPS AESCULAP MEDIUM BLUE 24

## (undated) DEVICE — NDL HYPO SAFE 18G X 1.5"

## (undated) DEVICE — STOPCOCK 3 WAY W SWIVEL MALE LUER LOCK

## (undated) DEVICE — DRSG TAPE TRANSPORE 3"

## (undated) DEVICE — SUT PDS II 2-0 27" CT-1

## (undated) DEVICE — SUT ETHIBOND 2-0 4-30" RB-1 GREEN

## (undated) DEVICE — CLAMP SOFT FIBRA INSERT

## (undated) DEVICE — MARKER SKIN MULTI TIP 6"

## (undated) DEVICE — NDL TAPR FR EYE 1/2 CIR 3

## (undated) DEVICE — SUT ETHIBOND 2 30" V37

## (undated) DEVICE — CONN REDUCR 3/8 X 1/2

## (undated) DEVICE — DRAPE 3/4 SHEET 52X76"

## (undated) DEVICE — SUT PROLENE 6-0 30" C-1

## (undated) DEVICE — SOL IRR POUR NS 0.9% 500ML

## (undated) DEVICE — TOURNIQUET KIT TOURNIKWIK STYLE

## (undated) DEVICE — SUT SOFSILK 4-0 24" CV-15

## (undated) DEVICE — SUT GORETEX CV-4 (3-0) 36" TH-18

## (undated) DEVICE — DRAPE SLUSH MACHINE 44X66"

## (undated) DEVICE — SUT PROLENE 4-0 36" RB-1

## (undated) DEVICE — SUT VICRYL 0 36" CT-1 UNDYED

## (undated) DEVICE — SAW BLADE STRYKER SAGITTAL SAFEDGE 40.5X47.5X0.64

## (undated) DEVICE — SUT PROLENE 4-0 36" SH

## (undated) DEVICE — PREP CHLORAPREP ORANGE 2PCT 26ML

## (undated) DEVICE — DRSG SAFETAC FOAM MEPILEX 4X12"

## (undated) DEVICE — PRESSURE INFUSER BAG 500ML DISP

## (undated) DEVICE — SUT PROLENE 5-0 36" C-1

## (undated) DEVICE — SUT PERMAHAND SILK 2 60" TIES

## (undated) DEVICE — SPONGE PEANUT AUTO COUNT

## (undated) DEVICE — GLV 7 ESTEEM BLUE

## (undated) DEVICE — SPONGE RAYTEC 4X4 16PLY

## (undated) DEVICE — NEEDLE COUNTER  FOAM AND MAGNET 40-70

## (undated) DEVICE — BLADE SURGICAL #10 CARBON

## (undated) DEVICE — ASSISTANT ATTACHMENT W STABLESOFT 2S